# Patient Record
Sex: FEMALE | Race: WHITE | NOT HISPANIC OR LATINO
[De-identification: names, ages, dates, MRNs, and addresses within clinical notes are randomized per-mention and may not be internally consistent; named-entity substitution may affect disease eponyms.]

---

## 2017-01-19 ENCOUNTER — APPOINTMENT (OUTPATIENT)
Dept: SURGICAL ONCOLOGY | Facility: CLINIC | Age: 77
End: 2017-01-19

## 2017-01-19 VITALS
DIASTOLIC BLOOD PRESSURE: 75 MMHG | HEIGHT: 65 IN | WEIGHT: 182 LBS | HEART RATE: 61 BPM | SYSTOLIC BLOOD PRESSURE: 146 MMHG | BODY MASS INDEX: 30.32 KG/M2

## 2017-02-28 ENCOUNTER — NON-APPOINTMENT (OUTPATIENT)
Age: 77
End: 2017-02-28

## 2017-02-28 ENCOUNTER — APPOINTMENT (OUTPATIENT)
Dept: CARDIOLOGY | Facility: CLINIC | Age: 77
End: 2017-02-28

## 2017-02-28 VITALS — DIASTOLIC BLOOD PRESSURE: 82 MMHG | RESPIRATION RATE: 14 BRPM | SYSTOLIC BLOOD PRESSURE: 136 MMHG

## 2017-02-28 VITALS — OXYGEN SATURATION: 95 % | WEIGHT: 184 LBS | HEIGHT: 65 IN | HEART RATE: 58 BPM | BODY MASS INDEX: 30.66 KG/M2

## 2017-02-28 DIAGNOSIS — G47.33 OBSTRUCTIVE SLEEP APNEA (ADULT) (PEDIATRIC): ICD-10-CM

## 2017-02-28 RX ORDER — UBIDECARENONE 100 MG
100 CAPSULE ORAL
Qty: 90 | Refills: 0 | Status: ACTIVE | COMMUNITY

## 2017-03-21 ENCOUNTER — APPOINTMENT (OUTPATIENT)
Dept: CARDIOLOGY | Facility: CLINIC | Age: 77
End: 2017-03-21

## 2017-04-18 ENCOUNTER — APPOINTMENT (OUTPATIENT)
Dept: CARDIOLOGY | Facility: CLINIC | Age: 77
End: 2017-04-18

## 2017-07-27 ENCOUNTER — APPOINTMENT (OUTPATIENT)
Dept: PULMONOLOGY | Facility: CLINIC | Age: 77
End: 2017-07-27
Payer: MEDICARE

## 2017-07-27 VITALS
HEIGHT: 65 IN | WEIGHT: 180 LBS | BODY MASS INDEX: 29.99 KG/M2 | HEART RATE: 64 BPM | RESPIRATION RATE: 15 BRPM | SYSTOLIC BLOOD PRESSURE: 130 MMHG | TEMPERATURE: 99 F | DIASTOLIC BLOOD PRESSURE: 76 MMHG

## 2017-07-27 PROCEDURE — 99204 OFFICE O/P NEW MOD 45 MIN: CPT | Mod: GC

## 2017-07-27 RX ORDER — CEFADROXIL 500 MG/1
500 CAPSULE ORAL
Qty: 14 | Refills: 0 | Status: DISCONTINUED | COMMUNITY
Start: 2016-10-11

## 2017-07-27 RX ORDER — ACETAMINOPHEN AND CODEINE PHOSPHATE 300; 15 MG/1; MG/1
300-15 TABLET ORAL
Qty: 15 | Refills: 0 | Status: DISCONTINUED | COMMUNITY
Start: 2016-10-11

## 2017-07-27 RX ORDER — DOXYCYCLINE HYCLATE 100 MG/1
100 CAPSULE ORAL
Qty: 7 | Refills: 0 | Status: DISCONTINUED | COMMUNITY
Start: 2016-10-31

## 2017-09-10 ENCOUNTER — APPOINTMENT (OUTPATIENT)
Dept: SLEEP CENTER | Facility: CLINIC | Age: 77
End: 2017-09-10
Payer: MEDICARE

## 2017-09-10 ENCOUNTER — OUTPATIENT (OUTPATIENT)
Dept: OUTPATIENT SERVICES | Facility: HOSPITAL | Age: 77
LOS: 1 days | End: 2017-09-10
Payer: MEDICARE

## 2017-09-10 PROCEDURE — 95810 POLYSOM 6/> YRS 4/> PARAM: CPT

## 2017-09-10 PROCEDURE — 95810 POLYSOM 6/> YRS 4/> PARAM: CPT | Mod: 26

## 2017-09-11 DIAGNOSIS — G47.33 OBSTRUCTIVE SLEEP APNEA (ADULT) (PEDIATRIC): ICD-10-CM

## 2017-10-11 ENCOUNTER — APPOINTMENT (OUTPATIENT)
Dept: SURGICAL ONCOLOGY | Facility: CLINIC | Age: 77
End: 2017-10-11
Payer: MEDICARE

## 2017-10-11 VITALS
DIASTOLIC BLOOD PRESSURE: 74 MMHG | BODY MASS INDEX: 29.99 KG/M2 | WEIGHT: 180 LBS | SYSTOLIC BLOOD PRESSURE: 128 MMHG | HEIGHT: 65 IN | RESPIRATION RATE: 16 BRPM | HEART RATE: 53 BPM

## 2017-10-11 PROCEDURE — 99213 OFFICE O/P EST LOW 20 MIN: CPT

## 2017-10-14 ENCOUNTER — TRANSCRIPTION ENCOUNTER (OUTPATIENT)
Age: 77
End: 2017-10-14

## 2018-05-17 ENCOUNTER — APPOINTMENT (OUTPATIENT)
Dept: CARDIOLOGY | Facility: CLINIC | Age: 78
End: 2018-05-17
Payer: MEDICARE

## 2018-05-17 ENCOUNTER — NON-APPOINTMENT (OUTPATIENT)
Age: 78
End: 2018-05-17

## 2018-05-17 VITALS — HEIGHT: 65 IN | BODY MASS INDEX: 31.49 KG/M2 | WEIGHT: 189 LBS

## 2018-05-17 VITALS — DIASTOLIC BLOOD PRESSURE: 79 MMHG | SYSTOLIC BLOOD PRESSURE: 154 MMHG | HEART RATE: 50 BPM | OXYGEN SATURATION: 97 %

## 2018-05-17 DIAGNOSIS — I35.1 NONRHEUMATIC AORTIC (VALVE) INSUFFICIENCY: ICD-10-CM

## 2018-05-17 PROCEDURE — 93000 ELECTROCARDIOGRAM COMPLETE: CPT

## 2018-05-17 PROCEDURE — 99214 OFFICE O/P EST MOD 30 MIN: CPT | Mod: 25

## 2019-02-08 ENCOUNTER — RECORD ABSTRACTING (OUTPATIENT)
Age: 79
End: 2019-02-08

## 2019-02-08 ENCOUNTER — RX RENEWAL (OUTPATIENT)
Age: 79
End: 2019-02-08

## 2019-02-08 DIAGNOSIS — Z80.51 FAMILY HISTORY OF MALIGNANT NEOPLASM OF KIDNEY: ICD-10-CM

## 2019-02-08 DIAGNOSIS — Z91.013 ALLERGY TO SEAFOOD: ICD-10-CM

## 2019-02-08 DIAGNOSIS — Z80.1 FAMILY HISTORY OF MALIGNANT NEOPLASM OF TRACHEA, BRONCHUS AND LUNG: ICD-10-CM

## 2019-02-08 DIAGNOSIS — Z83.3 FAMILY HISTORY OF DIABETES MELLITUS: ICD-10-CM

## 2019-02-08 DIAGNOSIS — Z86.79 PERSONAL HISTORY OF OTHER DISEASES OF THE CIRCULATORY SYSTEM: ICD-10-CM

## 2019-02-08 DIAGNOSIS — Z86.39 PERSONAL HISTORY OF OTHER ENDOCRINE, NUTRITIONAL AND METABOLIC DISEASE: ICD-10-CM

## 2019-02-08 DIAGNOSIS — Z87.19 PERSONAL HISTORY OF OTHER DISEASES OF THE DIGESTIVE SYSTEM: ICD-10-CM

## 2019-02-11 ENCOUNTER — RX RENEWAL (OUTPATIENT)
Age: 79
End: 2019-02-11

## 2019-03-27 ENCOUNTER — APPOINTMENT (OUTPATIENT)
Dept: INTERNAL MEDICINE | Facility: CLINIC | Age: 79
End: 2019-03-27
Payer: MEDICARE

## 2019-03-27 VITALS
WEIGHT: 185 LBS | BODY MASS INDEX: 30.82 KG/M2 | HEART RATE: 55 BPM | SYSTOLIC BLOOD PRESSURE: 130 MMHG | OXYGEN SATURATION: 98 % | DIASTOLIC BLOOD PRESSURE: 80 MMHG | TEMPERATURE: 97.4 F | HEIGHT: 65 IN | RESPIRATION RATE: 16 BRPM

## 2019-03-27 PROCEDURE — 99214 OFFICE O/P EST MOD 30 MIN: CPT

## 2019-03-27 NOTE — PLAN
[FreeTextEntry1] : Musculoskeletal \par cane prescription - gave written prescription for offset cane\par Gastroenterology\par GERD - continue Omeprazole 20mg .p.o.q.d. - continue antireflux measures\par Endocrinology\par hyperlipidemia - continue Atorvastatin 40mg .p.o.q.d. - continue low fat/low cholesterol diet \par Cardiology\par hypertension - continue Metoprolol 25mg .p.o.q.d.  - continue low sodium diet \par

## 2019-03-27 NOTE — HISTORY OF PRESENT ILLNESS
[FreeTextEntry1] : prescription for a offset cane [de-identified] : Patient is a 78 year old female with a past medical history as below who presents for prescription for cane with offset handle. She is currently borrowing someone else's cane at this time. She has a history of gait instability. Patient saw a neurologist and had nerve conduction studies and diagnosed patient with peripheral neuropathy. Patient states the neurologist discussed a possible association with spinal stenosis. She is taking all medications as prescribed and denies any adverse reactions or side effects. Patient continues Omeprazole 20mg and states she must maintain the medication to avoid reflux. Patient denies any myalgia or joint aches on Atorvastatin 20mg. Patient denies dizziness or lightheadedness on Metoprolol 25mg. Patient saw gynecologist recently who recommended starting cranberry supplements as she had WBCs in her urine.

## 2019-03-27 NOTE — ADDENDUM
[FreeTextEntry1] : I, Jossie Juan, acted solely as scribe for Dr. Maycol Saldana DO on this date Mar 27 2019 11:40AM .\par \par All medical record entries made by the Scribe were at my, Dr. Maycol Saldana DO direction and personally dictated by me on Mar 27 2019 11:40AM . I have reviewed the chart and agree that the record accurately reflects my personal performance of the history, physical exam, assessment and plan. I have also personally directed, reviewed and agreed with the chart.

## 2019-03-27 NOTE — ASSESSMENT
[FreeTextEntry1] : Patient is a 78 year old female with a past medical history as above who presents for prescription for an offset cane.\par

## 2019-03-27 NOTE — PHYSICAL EXAM

## 2019-04-09 ENCOUNTER — RX RENEWAL (OUTPATIENT)
Age: 79
End: 2019-04-09

## 2019-05-08 ENCOUNTER — RX RENEWAL (OUTPATIENT)
Age: 79
End: 2019-05-08

## 2019-05-09 ENCOUNTER — TRANSCRIPTION ENCOUNTER (OUTPATIENT)
Age: 79
End: 2019-05-09

## 2019-05-21 ENCOUNTER — APPOINTMENT (OUTPATIENT)
Dept: INTERNAL MEDICINE | Facility: CLINIC | Age: 79
End: 2019-05-21
Payer: MEDICARE

## 2019-05-21 VITALS
HEART RATE: 53 BPM | SYSTOLIC BLOOD PRESSURE: 128 MMHG | DIASTOLIC BLOOD PRESSURE: 70 MMHG | HEIGHT: 65 IN | RESPIRATION RATE: 14 BRPM | TEMPERATURE: 98.2 F | WEIGHT: 187.25 LBS | BODY MASS INDEX: 31.2 KG/M2 | OXYGEN SATURATION: 98 %

## 2019-05-21 PROCEDURE — 99214 OFFICE O/P EST MOD 30 MIN: CPT | Mod: 25

## 2019-05-21 PROCEDURE — 36415 COLL VENOUS BLD VENIPUNCTURE: CPT

## 2019-05-21 NOTE — HISTORY OF PRESENT ILLNESS
[FreeTextEntry1] : fasting blood work and general follow-up\par  [de-identified] : Patient is a 78 year old female with a past medical history as below who presents for fasting blood work and general follow-up. Patient states she is taking all medications as prescribed. She denies any new myalgias/arthralgias on Lipitor. She notes a recent episode of lightheadedness/dizziness while in a seated position. She notes history of vasovagal syncope.  She notes using a cane for movement outdoors given history of gait instability. Patient requests MMR titer test. \par

## 2019-05-21 NOTE — PHYSICAL EXAM

## 2019-05-21 NOTE — ADDENDUM
[FreeTextEntry1] : I, Maxx Cevallos, acted solely as scribe for Dr. Maycol Saldana DO on this date 05/21/2019  1:10PM .\par \par All medical record entries made by the Scribe were at my, Dr. Maycol Saldana DO direction and personally dictated by me on 05/21/2019  1:10PM. I have reviewed the chart and agree that the record accurately reflects my personal performance of the history, physical exam, assessment and plan. I have also personally directed, reviewed and agreed with the chart.\par

## 2019-05-21 NOTE — ASSESSMENT
[FreeTextEntry1] : Patient is a 78 year old female with a past medical history as above who presents for fasting blood work and general follow-up.

## 2019-05-21 NOTE — PLAN
[FreeTextEntry1] : Cardiology\par hypertension-continue Metoprolol Tartrate 25mg BID p.o.q.d.- continue low sodium diet\par continue CoQ10 100mg p.o.q.d.\par Endocrinology\par hypercholesterolemia-continue Lipitor 40mg p.o.q.d. as directed and omega-3 fish oil capsules 1000mg BID p.o.q.d. - continue low cholesterol/low fat diet - check FLP \par obesity-continue low carbohydrate diet and CV exercise\par continue vitamin D3 1000 unit capsules p.o.q.d.\par Neurology\par dizziness-check CBC/CMP-blood pressure normal-patient to follow up if symptoms persist/worsen \par gait instability-continue using cane as needed for movement outdoors \par Gastroenterology\par GERD-continue Omeprazole 20mg p.o.q.d. - continue antireflux measures\par Infectious Disease\par MMR titer drawn \par \par check female panel \par

## 2019-05-21 NOTE — REVIEW OF SYSTEMS
[Dizziness] : dizziness [Unsteady Walking] : ataxia [Negative] : Heme/Lymph [de-identified] : bumps on fingers on left hand

## 2019-05-28 LAB
25(OH)D3 SERPL-MCNC: 41.3 NG/ML
ALBUMIN SERPL ELPH-MCNC: 4.3 G/DL
ALP BLD-CCNC: 78 U/L
ALT SERPL-CCNC: 23 U/L
ANION GAP SERPL CALC-SCNC: 12 MMOL/L
AST SERPL-CCNC: 20 U/L
BASOPHILS # BLD AUTO: 0.03 K/UL
BASOPHILS NFR BLD AUTO: 0.6 %
BILIRUB SERPL-MCNC: 0.6 MG/DL
BUN SERPL-MCNC: 18 MG/DL
CALCIUM SERPL-MCNC: 9.5 MG/DL
CHLORIDE SERPL-SCNC: 103 MMOL/L
CHOLEST SERPL-MCNC: 177 MG/DL
CHOLEST/HDLC SERPL: 4.3 RATIO
CO2 SERPL-SCNC: 26 MMOL/L
CREAT SERPL-MCNC: 0.76 MG/DL
EOSINOPHIL # BLD AUTO: 0.23 K/UL
EOSINOPHIL NFR BLD AUTO: 4.6 %
ESTIMATED AVERAGE GLUCOSE: 117 MG/DL
GLUCOSE SERPL-MCNC: 102 MG/DL
HBA1C MFR BLD HPLC: 5.7 %
HCT VFR BLD CALC: 39 %
HDLC SERPL-MCNC: 41 MG/DL
HGB BLD-MCNC: 12.5 G/DL
IMM GRANULOCYTES NFR BLD AUTO: 0.2 %
LDLC SERPL CALC-MCNC: 99 MG/DL
LYMPHOCYTES # BLD AUTO: 2.41 K/UL
LYMPHOCYTES NFR BLD AUTO: 48.7 %
MAN DIFF?: NORMAL
MCHC RBC-ENTMCNC: 29 PG
MCHC RBC-ENTMCNC: 32.1 GM/DL
MCV RBC AUTO: 90.5 FL
MEV IGG FLD QL IA: >300 AU/ML
MEV IGG+IGM SER-IMP: POSITIVE
MONOCYTES # BLD AUTO: 0.43 K/UL
MONOCYTES NFR BLD AUTO: 8.7 %
MUV AB SER-ACNC: POSITIVE
MUV IGG SER QL IA: 276 AU/ML
NEUTROPHILS # BLD AUTO: 1.84 K/UL
NEUTROPHILS NFR BLD AUTO: 37.2 %
PLATELET # BLD AUTO: 214 K/UL
POTASSIUM SERPL-SCNC: 4.5 MMOL/L
PROT SERPL-MCNC: 7.1 G/DL
RBC # BLD: 4.31 M/UL
RBC # FLD: 14 %
RUBV IGG FLD-ACNC: 2.4 INDEX
RUBV IGG SER-IMP: POSITIVE
SODIUM SERPL-SCNC: 141 MMOL/L
TRIGL SERPL-MCNC: 187 MG/DL
TSH SERPL-ACNC: 2.58 UIU/ML
WBC # FLD AUTO: 4.95 K/UL

## 2019-07-08 ENCOUNTER — RX RENEWAL (OUTPATIENT)
Age: 79
End: 2019-07-08

## 2019-07-09 ENCOUNTER — RX RENEWAL (OUTPATIENT)
Age: 79
End: 2019-07-09

## 2019-07-10 ENCOUNTER — RX RENEWAL (OUTPATIENT)
Age: 79
End: 2019-07-10

## 2019-09-10 ENCOUNTER — APPOINTMENT (OUTPATIENT)
Dept: RHEUMATOLOGY | Facility: CLINIC | Age: 79
End: 2019-09-10
Payer: MEDICARE

## 2019-09-10 DIAGNOSIS — M79.641 PAIN IN RIGHT HAND: ICD-10-CM

## 2019-09-10 DIAGNOSIS — M79.642 PAIN IN RIGHT HAND: ICD-10-CM

## 2019-09-10 PROCEDURE — 99204 OFFICE O/P NEW MOD 45 MIN: CPT

## 2019-09-10 NOTE — PHYSICAL EXAM
[General Appearance - Alert] : alert [General Appearance - In No Acute Distress] : in no acute distress [General Appearance - Well Nourished] : well nourished [General Appearance - Well Developed] : well developed [General Appearance - Well-Appearing] : healthy appearing [Sclera] : the sclera and conjunctiva were normal [PERRL With Normal Accommodation] : pupils were equal in size, round, and reactive to light [Extraocular Movements] : extraocular movements were intact [Outer Ear] : the ears and nose were normal in appearance [Oropharynx] : the oropharynx was normal [Neck Appearance] : the appearance of the neck was normal [Neck Cervical Mass (___cm)] : no neck mass was observed [Jugular Venous Distention Increased] : there was no jugular-venous distention [Thyroid Diffuse Enlargement] : the thyroid was not enlarged [Thyroid Nodule] : there were no palpable thyroid nodules [Auscultation Breath Sounds / Voice Sounds] : lungs were clear to auscultation bilaterally [Heart Rate And Rhythm] : heart rate was normal and rhythm regular [Heart Sounds] : normal S1 and S2 [Heart Sounds Gallop] : no gallops [Murmurs] : no murmurs [Heart Sounds Pericardial Friction Rub] : no pericardial rub [Abnormal Walk] : normal gait [Nail Clubbing] : no clubbing  or cyanosis of the fingernails [Musculoskeletal - Swelling] : no joint swelling seen [Motor Tone] : muscle strength and tone were normal [Skin Color & Pigmentation] : normal skin color and pigmentation [Skin Turgor] : normal skin turgor [] : no rash [Deep Tendon Reflexes (DTR)] : deep tendon reflexes were 2+ and symmetric [Sensation] : the sensory exam was normal to light touch and pinprick [No Focal Deficits] : no focal deficits [FreeTextEntry1] : ambulates with cane [Oriented To Time, Place, And Person] : oriented to person, place, and time [Impaired Insight] : insight and judgment were intact [Mood] : the mood was normal [Affect] : the affect was normal [Memory Recent] : recent memory was not impaired [Memory Remote] : remote memory was not impaired

## 2019-09-10 NOTE — CONSULT LETTER
[Dear  ___] : Dear  [unfilled], [Consult Letter:] : I had the pleasure of evaluating your patient, [unfilled]. [Please see my note below.] : Please see my note below. [Consult Closing:] : Thank you very much for allowing me to participate in the care of this patient.  If you have any questions, please do not hesitate to contact me. [FreeTextEntry2] : Maycol Saldana

## 2019-09-10 NOTE — HISTORY OF PRESENT ILLNESS
[FreeTextEntry1] : 79 yo here for changes in her hands.\par \par Has developed changes in her hands left > right.  Knuckles of the left 2 and 3 as well as the left 4 and 3 particularly.  there has never been swelling, redness or warmth.  There really is no pain - more of a noticeable ache.  Cannot wear rings on the fingers any longer secondary to the enlargement of the joints.  doesn’t take any medications because the hands don’t hurt.  Is a very active  and has no limitation in this activity from that. \par \par there is no family hx of arthritis and mom's hands were fine.   Rheum ROS is negative.  \par \par \par \par  [None] : The patient is currently asymptomatic [de-identified] : - dry mouth and dry eyes - mild and chronic.  can eat crackers, makes tears.  denies zuhair quality or redness of the eye.

## 2019-09-10 NOTE — ASSESSMENT
[FreeTextEntry1] : 79 yo woman hx of SpSt and DDD here with deforminties of the hands \par \par #arthritis - chronic non-inflammatory cool changes - likely osteoarthritis\par - xray bilateral hands\par - d/w patient treatment options - if pain increases can use Tylenol, NSAID, paraffin etc - for symptomatic control \par - however minimal sx therefore doesn’t feel ready for pain meds. tylenol \par - will consider turmeric given the slight decreased in rom at the PIP joints\par \par

## 2019-09-11 ENCOUNTER — RX RENEWAL (OUTPATIENT)
Age: 79
End: 2019-09-11

## 2019-10-09 ENCOUNTER — RX RENEWAL (OUTPATIENT)
Age: 79
End: 2019-10-09

## 2019-10-11 ENCOUNTER — MESSAGE (OUTPATIENT)
Age: 79
End: 2019-10-11

## 2019-10-18 ENCOUNTER — MESSAGE (OUTPATIENT)
Age: 79
End: 2019-10-18

## 2019-10-29 ENCOUNTER — RX CHANGE (OUTPATIENT)
Age: 79
End: 2019-10-29

## 2019-10-30 ENCOUNTER — APPOINTMENT (OUTPATIENT)
Dept: INTERNAL MEDICINE | Facility: CLINIC | Age: 79
End: 2019-10-30
Payer: MEDICARE

## 2019-10-30 VITALS
RESPIRATION RATE: 16 BRPM | HEART RATE: 55 BPM | TEMPERATURE: 97.7 F | BODY MASS INDEX: 31.32 KG/M2 | HEIGHT: 65 IN | OXYGEN SATURATION: 97 % | WEIGHT: 188 LBS | SYSTOLIC BLOOD PRESSURE: 130 MMHG | DIASTOLIC BLOOD PRESSURE: 74 MMHG

## 2019-10-30 PROCEDURE — 99214 OFFICE O/P EST MOD 30 MIN: CPT | Mod: 25

## 2019-10-30 PROCEDURE — G0444 DEPRESSION SCREEN ANNUAL: CPT | Mod: 59

## 2019-10-30 PROCEDURE — 36415 COLL VENOUS BLD VENIPUNCTURE: CPT

## 2019-10-30 PROCEDURE — G0442 ANNUAL ALCOHOL SCREEN 15 MIN: CPT | Mod: 59

## 2019-10-30 NOTE — PHYSICAL EXAM
[No Acute Distress] : no acute distress [Well Nourished] : well nourished [Well Developed] : well developed [Well-Appearing] : well-appearing [Normal Sclera/Conjunctiva] : normal sclera/conjunctiva [PERRL] : pupils equal round and reactive to light [EOMI] : extraocular movements intact [Normal Outer Ear/Nose] : the outer ears and nose were normal in appearance [Normal Oropharynx] : the oropharynx was normal [No JVD] : no jugular venous distention [No Lymphadenopathy] : no lymphadenopathy [Supple] : supple [Thyroid Normal, No Nodules] : the thyroid was normal and there were no nodules present [No Respiratory Distress] : no respiratory distress  [No Accessory Muscle Use] : no accessory muscle use [Clear to Auscultation] : lungs were clear to auscultation bilaterally [Normal Rate] : normal rate  [Regular Rhythm] : with a regular rhythm [Normal S1, S2] : normal S1 and S2 [No Murmur] : no murmur heard [No Carotid Bruits] : no carotid bruits [No Abdominal Bruit] : a ~M bruit was not heard ~T in the abdomen [No Varicosities] : no varicosities [Pedal Pulses Present] : the pedal pulses are present [No Edema] : there was no peripheral edema [No Palpable Aorta] : no palpable aorta [No Extremity Clubbing/Cyanosis] : no extremity clubbing/cyanosis [Soft] : abdomen soft [Non Tender] : non-tender [Non-distended] : non-distended [No Masses] : no abdominal mass palpated [No HSM] : no HSM [Normal Bowel Sounds] : normal bowel sounds [Normal Posterior Cervical Nodes] : no posterior cervical lymphadenopathy [Normal Anterior Cervical Nodes] : no anterior cervical lymphadenopathy [No CVA Tenderness] : no CVA  tenderness [No Spinal Tenderness] : no spinal tenderness [No Joint Swelling] : no joint swelling [Grossly Normal Strength/Tone] : grossly normal strength/tone [No Rash] : no rash [Coordination Grossly Intact] : coordination grossly intact [No Focal Deficits] : no focal deficits [Normal Gait] : normal gait [Deep Tendon Reflexes (DTR)] : deep tendon reflexes were 2+ and symmetric [Normal Affect] : the affect was normal [Normal Insight/Judgement] : insight and judgment were intact [de-identified] : obese

## 2019-10-30 NOTE — ASSESSMENT
[FreeTextEntry1] : Patient is a 78 year old female with a past medical history as above who presents for fasting blood work, Rx refill, and general follow-up.\par

## 2019-10-30 NOTE — HISTORY OF PRESENT ILLNESS
[FreeTextEntry1] : fasting blood work, Rx refill, and general follow-up\par  [de-identified] : Patient is a 78 year old female with a past medical history as below who presents for fasting blood work, Rx refill, and general follow-up. Patient states she is taking all medications as prescribed and denies any adverse reactions or side effects. She denies lightheadedness or dizziness on Metoprolol Tartrate. GERD is well-managed on Omeprazole. Patient notes seeing rheumatologist, Dr. Warner who recommended starting Glucosamine/Chondroitin for osteoarthritis in her fingers. She also notes an intermittent tingling or pins/needles sensation in her fingers. She notes back pain. Patient states she frequently walks with a cane. Patient states she received the flu vaccine and 1st dose of Shingrix at [facility]. Patient requests Rx refill for Atorvastatin, Metoprolol Tartrate, and Omeprazole.

## 2019-10-30 NOTE — HEALTH RISK ASSESSMENT
[No] : In the past 12 months have you used drugs other than those required for medical reasons? No [0] : 1) Little interest or pleasure doing things: Not at all (0) [1] : 2) Feeling down, depressed, or hopeless for several days (1) [] : No [[id number]] : 1

## 2019-10-30 NOTE — REVIEW OF SYSTEMS
[Negative] : Heme/Lymph [Back Pain] : back pain [FreeTextEntry9] : osteoarthritis in fingers  [de-identified] : pins/needles sensation and tingling in fingers

## 2019-10-30 NOTE — PLAN
[FreeTextEntry1] : Cardiology\par hypertension - continue Metoprolol Tartrate 25mg BID p.o.q.d., Rx filled - continue low sodium diet\par continue Co-Q 10 100mg p.o.q.d.\par Endocrinology\par hypercholesterolemia - continue Atorvastatin Calcium 40mg p.o.q.d. as directed, Rx filled and omega-3 fish oil 1000mg BID p.o.q.d. - continue low cholesterol/low fat diet - check FLP and LFTs \par obesity - continue low carbohydrate diet\par continue vitamin D-3 1000 unit capsules p.o.q.d. - check vitamin D\par Musculoskeletal\par osteoarthritis - recommended start Osteo Bi-Flex p.o.q.d. - follow up with rheumatologist, Dr. Browning-Labarca as needed\par Constitutional/Neurology\par gait instability - continue using cane as needed for movement outdoors \par Gastroenterology\par GERD - continue Omeprazole 20mg p.o.q.d., Rx filled - continue antireflux measures\par Infectious Disease\par flu vaccine - evidence of vaccine administration to be provided by patient at next follow-up visit\par Shingrix - patient to follow up at [facility] for 2nd dose; evidence of vaccine administration to be provided by patient at next follow-up visit \par  \par check female panel

## 2019-10-30 NOTE — ADDENDUM
[FreeTextEntry1] : I, Maxx Cevallos, acted solely as scribe for Dr. Maycol Saldana DO on this date 10/30/2019 12:10PM .\par \par All medical record entries made by the Scribe were at my, Dr. Maycol Saldana DO direction and personally dictated by me on 10/30/2019 12:10PM. I have reviewed the chart and agree that the record accurately reflects my personal performance of the history, physical exam, assessment and plan. I have also personally directed, reviewed and agreed with the chart.\par

## 2019-11-08 LAB
25(OH)D3 SERPL-MCNC: 32.7 NG/ML
ALBUMIN SERPL ELPH-MCNC: 4.5 G/DL
ALP BLD-CCNC: 80 U/L
ALT SERPL-CCNC: 22 U/L
ANION GAP SERPL CALC-SCNC: 12 MMOL/L
AST SERPL-CCNC: 20 U/L
BASOPHILS # BLD AUTO: 0.03 K/UL
BASOPHILS NFR BLD AUTO: 0.6 %
BILIRUB SERPL-MCNC: 0.6 MG/DL
BUN SERPL-MCNC: 13 MG/DL
CALCIUM SERPL-MCNC: 9.6 MG/DL
CHLORIDE SERPL-SCNC: 106 MMOL/L
CHOLEST SERPL-MCNC: 180 MG/DL
CHOLEST/HDLC SERPL: 4.2 RATIO
CO2 SERPL-SCNC: 26 MMOL/L
CREAT SERPL-MCNC: 0.7 MG/DL
EOSINOPHIL # BLD AUTO: 0.19 K/UL
EOSINOPHIL NFR BLD AUTO: 4 %
ESTIMATED AVERAGE GLUCOSE: 117 MG/DL
GLUCOSE SERPL-MCNC: 100 MG/DL
HBA1C MFR BLD HPLC: 5.7 %
HCT VFR BLD CALC: 40.5 %
HDLC SERPL-MCNC: 43 MG/DL
HGB BLD-MCNC: 12.7 G/DL
IMM GRANULOCYTES NFR BLD AUTO: 0 %
LDLC SERPL CALC-MCNC: 93 MG/DL
LYMPHOCYTES # BLD AUTO: 1.7 K/UL
LYMPHOCYTES NFR BLD AUTO: 35.8 %
MAN DIFF?: NORMAL
MCHC RBC-ENTMCNC: 29.3 PG
MCHC RBC-ENTMCNC: 31.4 GM/DL
MCV RBC AUTO: 93.3 FL
MONOCYTES # BLD AUTO: 0.36 K/UL
MONOCYTES NFR BLD AUTO: 7.6 %
NEUTROPHILS # BLD AUTO: 2.47 K/UL
NEUTROPHILS NFR BLD AUTO: 52 %
PLATELET # BLD AUTO: 217 K/UL
POTASSIUM SERPL-SCNC: 4.3 MMOL/L
PROT SERPL-MCNC: 7.1 G/DL
RBC # BLD: 4.34 M/UL
RBC # FLD: 14.6 %
SODIUM SERPL-SCNC: 144 MMOL/L
TRIGL SERPL-MCNC: 222 MG/DL
TSH SERPL-ACNC: 2.5 UIU/ML
WBC # FLD AUTO: 4.75 K/UL

## 2020-02-28 ENCOUNTER — APPOINTMENT (OUTPATIENT)
Dept: INTERNAL MEDICINE | Facility: CLINIC | Age: 80
End: 2020-02-28
Payer: MEDICARE

## 2020-02-28 ENCOUNTER — NON-APPOINTMENT (OUTPATIENT)
Age: 80
End: 2020-02-28

## 2020-02-28 VITALS
DIASTOLIC BLOOD PRESSURE: 78 MMHG | BODY MASS INDEX: 31.65 KG/M2 | OXYGEN SATURATION: 97 % | WEIGHT: 190 LBS | HEIGHT: 65 IN | TEMPERATURE: 97.6 F | HEART RATE: 68 BPM | RESPIRATION RATE: 15 BRPM | SYSTOLIC BLOOD PRESSURE: 130 MMHG

## 2020-02-28 DIAGNOSIS — B34.9 VIRAL INFECTION, UNSPECIFIED: ICD-10-CM

## 2020-02-28 DIAGNOSIS — R05 COUGH: ICD-10-CM

## 2020-02-28 LAB
FLUAV SPEC QL CULT: NORMAL
FLUBV AG SPEC QL IA: NORMAL

## 2020-02-28 PROCEDURE — 94375 RESPIRATORY FLOW VOLUME LOOP: CPT

## 2020-02-28 PROCEDURE — 87804 INFLUENZA ASSAY W/OPTIC: CPT | Mod: QW

## 2020-02-28 PROCEDURE — 99214 OFFICE O/P EST MOD 30 MIN: CPT | Mod: 25

## 2020-02-28 RX ORDER — OMEGA-3/DHA/EPA/FISH OIL 300-1000MG
1000 CAPSULE,DELAYED RELEASE (ENTERIC COATED) ORAL TWICE DAILY
Refills: 0 | Status: ACTIVE | COMMUNITY

## 2020-02-28 NOTE — COUNSELING
[Decrease Portions] : decrease portions [Good understanding] : Patient has a good understanding of disease, goals and obesity follow-up plan [Encouraged to increase physical activity] : Encouraged to increase physical activity [Benefits of weight loss discussed] : Benefits of weight loss discussed

## 2020-03-03 ENCOUNTER — TRANSCRIPTION ENCOUNTER (OUTPATIENT)
Age: 80
End: 2020-03-03

## 2020-03-06 NOTE — ASSESSMENT
[FreeTextEntry1] : Patient is a 79 year old female with a past medical history as above who presents for evaluation of a cough and diarrhea.

## 2020-03-06 NOTE — PHYSICAL EXAM
[No Acute Distress] : no acute distress [Well Nourished] : well nourished [Well Developed] : well developed [Well-Appearing] : well-appearing [EOMI] : extraocular movements intact [PERRL] : pupils equal round and reactive to light [Normal Sclera/Conjunctiva] : normal sclera/conjunctiva [Normal Outer Ear/Nose] : the outer ears and nose were normal in appearance [Normal Oropharynx] : the oropharynx was normal [No JVD] : no jugular venous distention [Supple] : supple [No Lymphadenopathy] : no lymphadenopathy [Thyroid Normal, No Nodules] : the thyroid was normal and there were no nodules present [No Respiratory Distress] : no respiratory distress  [Normal Rate] : normal rate  [Clear to Auscultation] : lungs were clear to auscultation bilaterally [No Accessory Muscle Use] : no accessory muscle use [Regular Rhythm] : with a regular rhythm [Normal S1, S2] : normal S1 and S2 [No Murmur] : no murmur heard [No Carotid Bruits] : no carotid bruits [No Abdominal Bruit] : a ~M bruit was not heard ~T in the abdomen [No Varicosities] : no varicosities [Pedal Pulses Present] : the pedal pulses are present [No Edema] : there was no peripheral edema [No Palpable Aorta] : no palpable aorta [Soft] : abdomen soft [No Extremity Clubbing/Cyanosis] : no extremity clubbing/cyanosis [Non-distended] : non-distended [Non Tender] : non-tender [No HSM] : no HSM [No Masses] : no abdominal mass palpated [Normal Posterior Cervical Nodes] : no posterior cervical lymphadenopathy [Normal Bowel Sounds] : normal bowel sounds [Normal Anterior Cervical Nodes] : no anterior cervical lymphadenopathy [No CVA Tenderness] : no CVA  tenderness [No Spinal Tenderness] : no spinal tenderness [No Joint Swelling] : no joint swelling [No Rash] : no rash [Grossly Normal Strength/Tone] : grossly normal strength/tone [No Focal Deficits] : no focal deficits [Coordination Grossly Intact] : coordination grossly intact [Deep Tendon Reflexes (DTR)] : deep tendon reflexes were 2+ and symmetric [Normal Gait] : normal gait [Normal Insight/Judgement] : insight and judgment were intact [Normal Affect] : the affect was normal [de-identified] : obese

## 2020-03-06 NOTE — HISTORY OF PRESENT ILLNESS
[Moderate] : moderate [Cold Symptoms] : cold symptoms [___ Days ago] : [unfilled] days ago [Constant] : constant [Congestion] : congestion [Cough] : cough [Anorexia] : anorexia [Shortness Of Breath] : shortness of breath [Fatigue] : fatigue [Sore Throat] : no sore throat [Wheezing] : no wheezing [Chills] : no chills [Earache] : no earache [Headache] : no headache [Fever] : no fever [FreeTextEntry8] : Irirs states the diarrhea is much better.  Took OTC antidiarrhea medication which helped.  Still feels nausea but is improving.  \par Cough is the same.  Denies taking OTC medications.  Denies having a fever.  \par \par Also want a rx to see a cardiologist.  Patient states her cardiologist retired.  \par \par states since last visit been taking omega 3 2 tabs poi bid

## 2020-03-06 NOTE — DATA REVIEWED
[No studies available for review at this time.] : No studies available for review at this time. [FreeTextEntry1] : PFT normal \par Normal flu swab

## 2020-03-06 NOTE — HEALTH RISK ASSESSMENT
[No] : In the past 12 months have you used drugs other than those required for medical reasons? No [0] : 1) Little interest or pleasure doing things: Not at all (0) [1] : 2) Feeling down, depressed, or hopeless for several days (1) [] : No [[id number]] : 1 [Patient reported mammogram was normal] : Patient reported mammogram was normal [MammogramDate] : 02/19 [MammogramComments] : [id number]

## 2020-03-06 NOTE — PLAN
[FreeTextEntry1] : Id- viral syndrome - Flu A and B was negative-  Advised to increase fluids, Brat diet, and rest.  If not improving or getting worse to call the office ASAP.  \par \par Pulmonary - Cough -  Advised PFT was normal and lungs was CTA.  Advised to take benzonatate 200 mg tid.  \par \par Cardiology\par Pulmonary HTN -  Advised to follow up with cardio.  As per pt last cardiology exam was normal.  \par hypertension - continue Metoprolol Tartrate 25 mg BID p.o.q.d., Rx filled - continue low sodium diet\par continue Co-Q 10 100mg p.o.q.d.\par \par Endocrinology\par hypercholesterolemia - continue Atorvastatin Calcium 40mg p.o.q.d. as directed, Rx filled and omega-3 fish oil 1000mg BID p.o.q.d. - continue low cholesterol/low fat diet - check FLP and LFTs \par \par obesity - continue low carbohydrate diet.  Advised weight loss.  advised last A1c was in a prediabetic stage.  discusses ADA diet.  Check A1c and lipids in 2 months \par continue vitamin D-3 1000 unit capsules p.o.q.d. - check vitamin D\par \par Musculoskeletal\par osteoarthritis - recommended start Osteo Bi-Flex p.o.q.d. - follow up with rheumatologist, Dr. Warner as needed\par Constitutional/Neurology\par \par gait instability - continue using cane as needed for movement outdoors \par \par Gastroenterology\par GERD - continue Omeprazole 20mg p.o.q.d., Rx filled - continue antireflux measures\par \par Addendum reviewed MAmmo from 02/19 [facility]

## 2020-03-09 ENCOUNTER — TRANSCRIPTION ENCOUNTER (OUTPATIENT)
Age: 80
End: 2020-03-09

## 2020-04-26 ENCOUNTER — RX RENEWAL (OUTPATIENT)
Age: 80
End: 2020-04-26

## 2020-05-14 ENCOUNTER — RX RENEWAL (OUTPATIENT)
Age: 80
End: 2020-05-14

## 2020-05-15 ENCOUNTER — APPOINTMENT (OUTPATIENT)
Dept: INTERNAL MEDICINE | Facility: CLINIC | Age: 80
End: 2020-05-15
Payer: MEDICARE

## 2020-05-15 PROCEDURE — 99213 OFFICE O/P EST LOW 20 MIN: CPT | Mod: 95

## 2020-05-17 NOTE — PHYSICAL EXAM
[No Acute Distress] : no acute distress [Well Nourished] : well nourished [Well Developed] : well developed [Well-Appearing] : well-appearing [Normal Sclera/Conjunctiva] : normal sclera/conjunctiva [PERRL] : pupils equal round and reactive to light [EOMI] : extraocular movements intact [Normal Outer Ear/Nose] : the outer ears and nose were normal in appearance [Normal Oropharynx] : the oropharynx was normal [No JVD] : no jugular venous distention [No Lymphadenopathy] : no lymphadenopathy [Supple] : supple [Thyroid Normal, No Nodules] : the thyroid was normal and there were no nodules present [No Respiratory Distress] : no respiratory distress  [No Accessory Muscle Use] : no accessory muscle use [Clear to Auscultation] : lungs were clear to auscultation bilaterally [Normal Rate] : normal rate  [Regular Rhythm] : with a regular rhythm [Normal S1, S2] : normal S1 and S2 [No Murmur] : no murmur heard [No Carotid Bruits] : no carotid bruits [No Abdominal Bruit] : a ~M bruit was not heard ~T in the abdomen [No Varicosities] : no varicosities [Pedal Pulses Present] : the pedal pulses are present [No Edema] : there was no peripheral edema [No Palpable Aorta] : no palpable aorta [No Extremity Clubbing/Cyanosis] : no extremity clubbing/cyanosis [Soft] : abdomen soft [Non Tender] : non-tender [Non-distended] : non-distended [No Masses] : no abdominal mass palpated [No HSM] : no HSM [Normal Bowel Sounds] : normal bowel sounds [Normal Posterior Cervical Nodes] : no posterior cervical lymphadenopathy [Normal Anterior Cervical Nodes] : no anterior cervical lymphadenopathy [No CVA Tenderness] : no CVA  tenderness [No Spinal Tenderness] : no spinal tenderness [No Joint Swelling] : no joint swelling [Grossly Normal Strength/Tone] : grossly normal strength/tone [No Rash] : no rash [Coordination Grossly Intact] : coordination grossly intact [No Focal Deficits] : no focal deficits [Normal Gait] : normal gait [Deep Tendon Reflexes (DTR)] : deep tendon reflexes were 2+ and symmetric [Normal Affect] : the affect was normal [Normal Insight/Judgement] : insight and judgment were intact [Normal] : affect was normal and insight and judgment were intact [de-identified] : obese

## 2020-05-17 NOTE — HISTORY OF PRESENT ILLNESS
[FreeTextEntry1] : telehealth\par hyperlipidemia\par  [de-identified] : Patient is a 79 year old female with a past medical history as below who presents for general follow-up. Patient states she is taking all medications as prescribed and denies any adverse reactions or side effects. She denies lightheadedness or dizziness on Metoprolol Tartrate. GERD is well-managed on Omeprazole. Patient notes seeing rheumatologist, Dr. Warner who recommended starting Glucosamine/Chondroitin for osteoarthritis in her fingers. She also notes that she is taking the "[facility] stay at home ordersa" very seriously and that she has not left her home in 2 months.  She gets all food and necessary supplies by delivery.  As such, she will not come into the office or let any technician come into her home to take her blood.  She states that she has taken all of her medications for decades and that she feels very comfortable waiting until she feels safe to get surveillance blood work performed.

## 2020-05-17 NOTE — HEALTH RISK ASSESSMENT
[] : No [No] : In the past 12 months have you used drugs other than those required for medical reasons? No [0] : 1) Little interest or pleasure doing things: Not at all (0) [1] : 2) Feeling down, depressed, or hopeless for several days (1) [[id number]] : 1

## 2020-05-17 NOTE — PLAN
[FreeTextEntry1] : Cardiology\par hypertension - continue Metoprolol Tartrate 25mg BID p.o.q.d., - continue low sodium diet\par continue Co-Q 10 100mg p.o.q.d.\par Endocrinology\par hypercholesterolemia - continue Atorvastatin Calcium 40mg p.o.q.d. as directed, Rx filled and omega-3 fish oil 1000mg BID p.o.q.d. - continue low cholesterol/low fat diet - check FLP and LFTs when feeling safe for live appointment \par obesity - continue low carbohydrate diet\par continue vitamin D-3 1000 unit capsules p.o.q.d. - check vitamin D\par Musculoskeletal\par osteoarthritis - recommended start Osteo Bi-Flex p.o.q.d. - follow up with rheumatologist, Dr. Warner as needed\par Constitutional/Neurology\par gait instability - continue using cane as needed for movement outdoors \par Gastroenterology\par GERD - continue Omeprazole 20mg p.o.q.d., Rx filled - continue antireflux measures\par

## 2020-05-17 NOTE — ASSESSMENT
[FreeTextEntry1] : Patient is a 78 year old female with a past medical history as above who presents virtually for general follow-up.\par

## 2020-05-17 NOTE — DISCUSSION/SUMMARY
[FreeTextEntry1] : discussed bw with patient-for hypertriglyceridemia she will lower her fat and sugar intake which should also help with the slight increase in ac-repeat FBW in 3 months\par AK

## 2020-07-22 ENCOUNTER — RX RENEWAL (OUTPATIENT)
Age: 80
End: 2020-07-22

## 2020-09-10 ENCOUNTER — RX RENEWAL (OUTPATIENT)
Age: 80
End: 2020-09-10

## 2020-12-06 ENCOUNTER — RX RENEWAL (OUTPATIENT)
Age: 80
End: 2020-12-06

## 2020-12-11 ENCOUNTER — APPOINTMENT (OUTPATIENT)
Dept: INTERNAL MEDICINE | Facility: CLINIC | Age: 80
End: 2020-12-11

## 2020-12-24 ENCOUNTER — RX RENEWAL (OUTPATIENT)
Age: 80
End: 2020-12-24

## 2020-12-27 ENCOUNTER — RX RENEWAL (OUTPATIENT)
Age: 80
End: 2020-12-27

## 2021-03-04 ENCOUNTER — RX RENEWAL (OUTPATIENT)
Age: 81
End: 2021-03-04

## 2021-03-24 ENCOUNTER — APPOINTMENT (OUTPATIENT)
Dept: INTERNAL MEDICINE | Facility: CLINIC | Age: 81
End: 2021-03-24
Payer: MEDICARE

## 2021-03-24 ENCOUNTER — LABORATORY RESULT (OUTPATIENT)
Age: 81
End: 2021-03-24

## 2021-03-24 VITALS
OXYGEN SATURATION: 95 % | DIASTOLIC BLOOD PRESSURE: 82 MMHG | HEIGHT: 65 IN | BODY MASS INDEX: 30.84 KG/M2 | HEART RATE: 68 BPM | WEIGHT: 185.13 LBS | SYSTOLIC BLOOD PRESSURE: 160 MMHG | RESPIRATION RATE: 16 BRPM | TEMPERATURE: 97.4 F

## 2021-03-24 VITALS — SYSTOLIC BLOOD PRESSURE: 130 MMHG | DIASTOLIC BLOOD PRESSURE: 78 MMHG

## 2021-03-24 DIAGNOSIS — R06.02 SHORTNESS OF BREATH: ICD-10-CM

## 2021-03-24 PROCEDURE — 99214 OFFICE O/P EST MOD 30 MIN: CPT | Mod: CS,25

## 2021-03-24 PROCEDURE — 36415 COLL VENOUS BLD VENIPUNCTURE: CPT

## 2021-03-24 RX ORDER — MULTIVITAMIN
TABLET ORAL
Refills: 0 | Status: ACTIVE | COMMUNITY
Start: 2021-03-24

## 2021-03-24 NOTE — HEALTH RISK ASSESSMENT
[No] : In the past 12 months have you used drugs other than those required for medical reasons? No [0] : 1) Little interest or pleasure doing things: Not at all (0) [1] : 2) Feeling down, depressed, or hopeless for several days (1) [] : No [Audit-CScore] : 0 [ETH5Nnofj] : 1 [MammogramDate] : 02/19 [MammogramComments] : BI-RADS 2: Benign findings; results of most recent imaging to be requested from [facility]. [BoneDensityDate] : 05/19 [BoneDensityComments] : Osteopenia.  [ColonoscopyDate] : 08/13 [ColonoscopyComments] : Sigmoid polyp and internal hemorrhoids.

## 2021-03-24 NOTE — ADDENDUM
[FreeTextEntry1] : I, Maxx Cevallos, acted solely as scribe for Dr. Maycol Saldana DO on this date 03/24/2021 10:00AM .\par \par All medical record entries made by the Scribe were at my, Dr. Maycol Saldana DO direction and personally dictated by me on 03/24/2021 10:00AM. I have reviewed the chart and agree that the record accurately reflects my personal performance of the history, physical exam, assessment and plan. I have also personally directed, reviewed and agreed with the chart.\par

## 2021-03-24 NOTE — REVIEW OF SYSTEMS
[Negative] : Heme/Lymph [Fatigue] : fatigue [Shortness Of Breath] : shortness of breath [FreeTextEntry5] : discoloration of feet  [FreeTextEntry9] : gait instability; hip and knee discomfort [de-identified] : pins/needles sensation in fingers and toes; RLE weaker than LLE

## 2021-03-24 NOTE — ASSESSMENT
[FreeTextEntry1] : Patient is a 80 year old female with a past medical history as above who presents for fasting blood work and general follow-up.\par

## 2021-03-24 NOTE — PLAN
[FreeTextEntry1] : Pulmonary\par SOB - referred to cardiologist, Dr. Schaffer for further evaluation/testing-likely secondary to significant deconditioning from not leaving her home in approximately 1 year \par Cardiology\par hypertension - continue Metoprolol Tartrate 25mg BID p.o.q.d. as directed - continue low sodium diet and weight loss; will continue to monitor BP\par hypercholesterolemia - continue Atorvastatin Calcium 40mg p.o.q.d. - continue low cholesterol/low fat diet - check FLP and LFTs\par hypertriglyceridemia - continue Omega-3 Fish Oil 1000mg 2 tablets BID p.o.q.d. as directed - continue low cholesterol/low fat and low carbohydrate/low sugar diet - check FLP and LFTs\par Endocrinology\par obesity - continue low carbohydrate diet; recommended gradually increasing CV exercise, check A1C\par hyperglycemia - continue low carbohydrate/low sugar diet - check hemoglobin A1C\par continue Vitamin D-3 1000 IU p.o.q.d. with meals as directed - check Vitamin D \par Gynecology\par Results of most recent breast imaging to be requested\par Musculoskeletal\par gait instability - Rx given for PT, likely secondary to deconditioning\par Neurology\par pins/needles sensation in fingers and toes - likely due to nerve impingement from sleeping in an awkward position\par Referred to neurologist, Dr. Sherman \par Gastroenterology\par GERD - continue Omeprazole 20mg p.o.q.d. - continue antireflux measures\par Podiatry\par Follow up with podiatrist, Dr. Lennon to have toenails cut and for foot hygiene\par Urology\par check UA w/ Reflex Urine Culture - referred to urologist, Dr. Calderon\par Infectious Disease\par check COVID-19 Nucleocapsid Antibody \par \par check female panel, hemoglobin A1C, COVID-19 Nucleocapsid Antibody, and UA

## 2021-03-24 NOTE — HISTORY OF PRESENT ILLNESS
[FreeTextEntry1] : fasting blood work and general follow-up\par  [de-identified] : Patient is a 80 year old female with a past medical history as below who presents for fasting blood work and general follow-up. Patient states she is taking all medications as prescribed and denies any adverse reactions or side effects. She denies lightheadedness or dizziness on Metoprolol Tartrate. GERD is well-managed on Omeprazole. Patient notes having breast imaging several months ago. Patient notes SOB and having less energy than usual. She inquires about a referral to a cardiologist. Patient notes bilateral hip and knee discomfort. She states her RLE has felt weaker than the LLE, most noticeable when walking upstairs. She notes gait instability. Patient notes a pins/needles sensation in her fingers and toes when she wakes up. She states the sensation resolves with movement. She inquires about a referral to a new neurologist. Patient notes mild discoloration of the feet. She has not seen podiatrist, Dr. Lennon in the past year. Patient inquires about a referral to a urologist. Patient has received both doses of the COVID-19 Vaccine. She noted some upper extremity erythema shortly after the vaccine was administered. She inquires about testing for COVID-19 antibodies with blood work today.  Of note, she essentially did not leave her house for almost 1 year secondary to the covid pandemic.  As a result, her physical activity has been very limited as well as her cardiopulmonary exercise.

## 2021-03-25 ENCOUNTER — LABORATORY RESULT (OUTPATIENT)
Age: 81
End: 2021-03-25

## 2021-04-01 ENCOUNTER — NON-APPOINTMENT (OUTPATIENT)
Age: 81
End: 2021-04-01

## 2021-04-01 LAB
25(OH)D3 SERPL-MCNC: 29.9 NG/ML
ALBUMIN SERPL ELPH-MCNC: 4.4 G/DL
ALP BLD-CCNC: 82 U/L
ALT SERPL-CCNC: 29 U/L
ANION GAP SERPL CALC-SCNC: 14 MMOL/L
APPEARANCE: CLEAR
AST SERPL-CCNC: 26 U/L
BASOPHILS # BLD AUTO: 0.03 K/UL
BASOPHILS NFR BLD AUTO: 0.6 %
BILIRUB SERPL-MCNC: 0.6 MG/DL
BILIRUBIN URINE: NEGATIVE
BLOOD URINE: NEGATIVE
BUN SERPL-MCNC: 16 MG/DL
CALCIUM SERPL-MCNC: 9.5 MG/DL
CHLORIDE SERPL-SCNC: 104 MMOL/L
CHOLEST SERPL-MCNC: 179 MG/DL
CO2 SERPL-SCNC: 24 MMOL/L
COLOR: NORMAL
COVID-19 NUCLEOCAPSID  GAM ANTIBODY INTERPRETATION: NEGATIVE
CREAT SERPL-MCNC: 0.78 MG/DL
EOSINOPHIL # BLD AUTO: 0.16 K/UL
EOSINOPHIL NFR BLD AUTO: 3.1 %
ESTIMATED AVERAGE GLUCOSE: 120 MG/DL
GLUCOSE QUALITATIVE U: NEGATIVE
GLUCOSE SERPL-MCNC: 99 MG/DL
HBA1C MFR BLD HPLC: 5.8 %
HCT VFR BLD CALC: 41 %
HDLC SERPL-MCNC: 43 MG/DL
HGB BLD-MCNC: 13 G/DL
IMM GRANULOCYTES NFR BLD AUTO: 0.2 %
KETONES URINE: NEGATIVE
LDLC SERPL CALC-MCNC: 97 MG/DL
LEUKOCYTE ESTERASE URINE: ABNORMAL
LYMPHOCYTES # BLD AUTO: 1.87 K/UL
LYMPHOCYTES NFR BLD AUTO: 36.5 %
MAN DIFF?: NORMAL
MCHC RBC-ENTMCNC: 29.2 PG
MCHC RBC-ENTMCNC: 31.7 GM/DL
MCV RBC AUTO: 92.1 FL
MONOCYTES # BLD AUTO: 0.49 K/UL
MONOCYTES NFR BLD AUTO: 9.6 %
NEUTROPHILS # BLD AUTO: 2.56 K/UL
NEUTROPHILS NFR BLD AUTO: 50 %
NITRITE URINE: NEGATIVE
NONHDLC SERPL-MCNC: 136 MG/DL
PH URINE: 6
PLATELET # BLD AUTO: 237 K/UL
POTASSIUM SERPL-SCNC: 4.3 MMOL/L
PROT SERPL-MCNC: 7.3 G/DL
PROTEIN URINE: NEGATIVE
RBC # BLD: 4.45 M/UL
RBC # FLD: 15 %
SARS-COV-2 AB SERPL QL IA: 0.07 INDEX
SODIUM SERPL-SCNC: 142 MMOL/L
SPECIFIC GRAVITY URINE: 1.02
TRIGL SERPL-MCNC: 196 MG/DL
TSH SERPL-ACNC: 3.39 UIU/ML
UROBILINOGEN URINE: NORMAL
WBC # FLD AUTO: 5.12 K/UL

## 2021-05-01 ENCOUNTER — OUTPATIENT (OUTPATIENT)
Dept: OUTPATIENT SERVICES | Facility: HOSPITAL | Age: 81
LOS: 1 days | Discharge: HOME | End: 2021-05-01

## 2021-05-01 DIAGNOSIS — C4A.9 MERKEL CELL CARCINOMA, UNSPECIFIED: ICD-10-CM

## 2021-05-04 ENCOUNTER — APPOINTMENT (OUTPATIENT)
Dept: HEMATOLOGY ONCOLOGY | Facility: CLINIC | Age: 81
End: 2021-05-04
Payer: MEDICARE

## 2021-05-04 ENCOUNTER — RESULT REVIEW (OUTPATIENT)
Age: 81
End: 2021-05-04

## 2021-05-04 VITALS
SYSTOLIC BLOOD PRESSURE: 151 MMHG | WEIGHT: 185.19 LBS | OXYGEN SATURATION: 94 % | BODY MASS INDEX: 32.01 KG/M2 | HEIGHT: 63.78 IN | TEMPERATURE: 97.6 F | RESPIRATION RATE: 16 BRPM | DIASTOLIC BLOOD PRESSURE: 81 MMHG | HEART RATE: 62 BPM

## 2021-05-04 DIAGNOSIS — Z63.4 DISAPPEARANCE AND DEATH OF FAMILY MEMBER: ICD-10-CM

## 2021-05-04 DIAGNOSIS — C43.72 MALIGNANT MELANOMA OF LEFT LOWER LIMB, INCLUDING HIP: ICD-10-CM

## 2021-05-04 PROCEDURE — 99204 OFFICE O/P NEW MOD 45 MIN: CPT

## 2021-05-04 SDOH — SOCIAL STABILITY - SOCIAL INSECURITY: DISSAPEARANCE AND DEATH OF FAMILY MEMBER: Z63.4

## 2021-05-04 NOTE — CONSULT LETTER
[Dear  ___] : Dear  [unfilled], [Consult Letter:] : I had the pleasure of evaluating your patient, [unfilled]. [Please see my note below.] : Please see my note below. [Consult Closing:] : Thank you very much for allowing me to participate in the care of this patient.  If you have any questions, please do not hesitate to contact me. [Sincerely,] : Sincerely, [DrFadia  ___] : Dr. CONNOR [DrFadia ___] : Dr. CONNOR

## 2021-05-04 NOTE — REASON FOR VISIT
[Initial Consultation] : an initial consultation [Family Member] : family member [FreeTextEntry2] : Merkel Cell CA

## 2021-05-04 NOTE — HISTORY OF PRESENT ILLNESS
[Disease: _____________________] : Disease: [unfilled] [T: ___] : T[unfilled] [N: ___] : N[unfilled] [M: ___] : M[unfilled] [AJCC Stage: ____] : AJCC Stage: [unfilled] [de-identified] : Mrs. Randolph is a 80 year old female with past medical history of HTN, HLD, GERD, sleep apnea presenting to the office for an initial consultation of Merkel cell CA.\par \par Patient noticed a small pimple on the border of her left nares since at least October 2020.\par Later, she sought attention for this.\par She underwent biopsy of the left inner nostril with her dermatologist (Dr. Brooke Wu) on 4/21/2021 which demonstrated Merkel cell CA\par The pathology report has been reviewed, and I discussed with Dr. Wu.\par The lesion is small (~2 x 1 mm) and there is a positive deep margin.\par Patient and son and I discussed possible re-excision with Dr. Mckeon and adjuvant or primary radiation.\par \par Prior Melanoma:\par On 10/21/2016 underwent wide excision of an uncomplicated 0.6 mm melanoma from the left calf, with negative margins with Dr. Quinten Mckeon and reconstruction by Dr. Nichole\par This was an asymptomatic pigmented lesion discovered on routine skin and evaluation.\par Pathology: T1a [de-identified] : Merkel cell carcinoma [de-identified] : Dermatology: Brooke Wu\par Internist: Maycol Saldana\par Rheumatology: Estela Warner\par Surgical Oncology: Quinten Mckeon\par Radiation Oncology: Jess Diaz\par \par Son: Mart [phone]\par Other son: Rex

## 2021-05-04 NOTE — PHYSICAL EXAM
[Restricted in physically strenuous activity but ambulatory and able to carry out work of a light or sedentary nature] : Status 1- Restricted in physically strenuous activity but ambulatory and able to carry out work of a light or sedentary nature, e.g., light house work, office work [Normal] : affect appropriate [de-identified] : no lesions on nose or face

## 2021-05-06 ENCOUNTER — OUTPATIENT (OUTPATIENT)
Dept: OUTPATIENT SERVICES | Facility: HOSPITAL | Age: 81
LOS: 1 days | Discharge: HOME | End: 2021-05-06

## 2021-05-07 ENCOUNTER — APPOINTMENT (OUTPATIENT)
Dept: NUCLEAR MEDICINE | Facility: CLINIC | Age: 81
End: 2021-05-07
Payer: MEDICARE

## 2021-05-07 ENCOUNTER — OUTPATIENT (OUTPATIENT)
Dept: OUTPATIENT SERVICES | Facility: HOSPITAL | Age: 81
LOS: 1 days | End: 2021-05-07
Payer: MEDICARE

## 2021-05-07 DIAGNOSIS — C4A.30 MERKEL CELL CARCINOMA OF UNSPECIFIED PART OF FACE: ICD-10-CM

## 2021-05-07 PROCEDURE — 78816 PET IMAGE W/CT FULL BODY: CPT

## 2021-05-07 PROCEDURE — 78816 PET IMAGE W/CT FULL BODY: CPT | Mod: 26,PI,MH

## 2021-05-07 PROCEDURE — A9552: CPT

## 2021-05-10 ENCOUNTER — APPOINTMENT (OUTPATIENT)
Dept: SURGICAL ONCOLOGY | Facility: CLINIC | Age: 81
End: 2021-05-10
Payer: MEDICARE

## 2021-05-10 ENCOUNTER — APPOINTMENT (OUTPATIENT)
Dept: RADIATION ONCOLOGY | Facility: CLINIC | Age: 81
End: 2021-05-10
Payer: MEDICARE

## 2021-05-10 VITALS
TEMPERATURE: 96.4 F | DIASTOLIC BLOOD PRESSURE: 84 MMHG | SYSTOLIC BLOOD PRESSURE: 161 MMHG | HEART RATE: 54 BPM | WEIGHT: 184.53 LBS | BODY MASS INDEX: 31.89 KG/M2 | HEIGHT: 63.78 IN | RESPIRATION RATE: 16 BRPM | OXYGEN SATURATION: 97 %

## 2021-05-10 VITALS
BODY MASS INDEX: 32.6 KG/M2 | RESPIRATION RATE: 15 BRPM | HEIGHT: 63 IN | OXYGEN SATURATION: 97 % | HEART RATE: 55 BPM | DIASTOLIC BLOOD PRESSURE: 80 MMHG | WEIGHT: 184 LBS | SYSTOLIC BLOOD PRESSURE: 159 MMHG

## 2021-05-10 PROCEDURE — 99204 OFFICE O/P NEW MOD 45 MIN: CPT | Mod: 25

## 2021-05-10 PROCEDURE — 99205 OFFICE O/P NEW HI 60 MIN: CPT

## 2021-05-12 NOTE — HISTORY OF PRESENT ILLNESS
[FreeTextEntry1] : Mrs. Randolph is a 80 year old female with past medical history of HTN, HLD, GERD, sleep apnea presenting to the office for an initial consultation of Merkel cell carcinoma.\par Patient noticed a small pimple on the border of her left nares since at least October 2020.  Later, she sought attention for this.\par She underwent biopsy of the left inner nostril with her dermatologist (Dr. Brooke Wu) on 4/21/2021 which demonstrated Merkel cell ca.\par She has a history of prior Melanoma on 10/21/2016 underwent wide excision of an uncomplicated 0.6 mm melanoma from the left calf, with negative margins with Dr. Quinten Mckeon and reconstruction by Dr. Nichole\par This was an asymptomatic pigmented lesion discovered on routine skin and evaluation.\par Pathology: T1a. \par

## 2021-05-12 NOTE — REVIEW OF SYSTEMS
[Dry Eyes] : dryness of the eyes [Difficulty Walking] : difficulty walking [Negative] : Allergic/Immunologic [Eye Pain] : no eye pain [Red Eyes] : eyes not red [Visual Disturbances] : no visual disturbances [Confused] : no confusion [Dizziness] : no dizziness [Fainting] : no fainting

## 2021-05-24 ENCOUNTER — APPOINTMENT (OUTPATIENT)
Dept: PLASTIC SURGERY | Facility: CLINIC | Age: 81
End: 2021-05-24
Payer: MEDICARE

## 2021-05-24 ENCOUNTER — RESULT REVIEW (OUTPATIENT)
Age: 81
End: 2021-05-24

## 2021-05-24 VITALS
HEIGHT: 63 IN | TEMPERATURE: 98 F | OXYGEN SATURATION: 95 % | WEIGHT: 185 LBS | DIASTOLIC BLOOD PRESSURE: 76 MMHG | HEART RATE: 70 BPM | SYSTOLIC BLOOD PRESSURE: 175 MMHG | BODY MASS INDEX: 32.78 KG/M2

## 2021-05-24 PROCEDURE — 99204 OFFICE O/P NEW MOD 45 MIN: CPT

## 2021-05-24 NOTE — CONSULT LETTER
[Dear  ___] : Dear  [unfilled], [Consult Letter:] : I had the pleasure of evaluating your patient, [unfilled]. [Please see my note below.] : Please see my note below. [Consult Closing:] : Thank you very much for allowing me to participate in the care of this patient.  If you have any questions, please do not hesitate to contact me. [Sincerely,] : Sincerely, [FreeTextEntry3] : Dustin Owen MD, FACS

## 2021-05-24 NOTE — REASON FOR VISIT
[Consultation] : a consultation visit [Family Member] : family member [FreeTextEntry1] : Dr. Quinten Mckeon (Surgical Oncology)

## 2021-05-27 ENCOUNTER — OUTPATIENT (OUTPATIENT)
Dept: OUTPATIENT SERVICES | Facility: HOSPITAL | Age: 81
LOS: 1 days | End: 2021-05-27
Payer: MEDICARE

## 2021-05-27 VITALS
RESPIRATION RATE: 14 BRPM | HEART RATE: 55 BPM | TEMPERATURE: 98 F | OXYGEN SATURATION: 97 % | DIASTOLIC BLOOD PRESSURE: 78 MMHG | WEIGHT: 176.37 LBS | SYSTOLIC BLOOD PRESSURE: 168 MMHG | HEIGHT: 64 IN

## 2021-05-27 DIAGNOSIS — Z98.890 OTHER SPECIFIED POSTPROCEDURAL STATES: Chronic | ICD-10-CM

## 2021-05-27 DIAGNOSIS — C4A.30 MERKEL CELL CARCINOMA OF UNSPECIFIED PART OF FACE: ICD-10-CM

## 2021-05-27 DIAGNOSIS — M19.90 UNSPECIFIED OSTEOARTHRITIS, UNSPECIFIED SITE: Chronic | ICD-10-CM

## 2021-05-27 DIAGNOSIS — M48.061 SPINAL STENOSIS, LUMBAR REGION WITHOUT NEUROGENIC CLAUDICATION: Chronic | ICD-10-CM

## 2021-05-27 DIAGNOSIS — C4A.9 MERKEL CELL CARCINOMA, UNSPECIFIED: ICD-10-CM

## 2021-05-27 DIAGNOSIS — Z01.818 ENCOUNTER FOR OTHER PREPROCEDURAL EXAMINATION: ICD-10-CM

## 2021-05-27 LAB
ANION GAP SERPL CALC-SCNC: 6 MMOL/L — SIGNIFICANT CHANGE UP (ref 5–17)
BUN SERPL-MCNC: 16 MG/DL — SIGNIFICANT CHANGE UP (ref 7–23)
CALCIUM SERPL-MCNC: 9.1 MG/DL — SIGNIFICANT CHANGE UP (ref 8.4–10.5)
CHLORIDE SERPL-SCNC: 105 MMOL/L — SIGNIFICANT CHANGE UP (ref 96–108)
CO2 SERPL-SCNC: 29 MMOL/L — SIGNIFICANT CHANGE UP (ref 22–31)
CREAT SERPL-MCNC: 0.87 MG/DL — SIGNIFICANT CHANGE UP (ref 0.5–1.3)
GLUCOSE SERPL-MCNC: 112 MG/DL — HIGH (ref 70–99)
HCT VFR BLD CALC: 39.8 % — SIGNIFICANT CHANGE UP (ref 34.5–45)
HGB BLD-MCNC: 13.3 G/DL — SIGNIFICANT CHANGE UP (ref 11.5–15.5)
MCHC RBC-ENTMCNC: 30 PG — SIGNIFICANT CHANGE UP (ref 27–34)
MCHC RBC-ENTMCNC: 33.4 GM/DL — SIGNIFICANT CHANGE UP (ref 32–36)
MCV RBC AUTO: 89.6 FL — SIGNIFICANT CHANGE UP (ref 80–100)
NRBC # BLD: 0 /100 WBCS — SIGNIFICANT CHANGE UP (ref 0–0)
PLATELET # BLD AUTO: 248 K/UL — SIGNIFICANT CHANGE UP (ref 150–400)
POTASSIUM SERPL-MCNC: 4.4 MMOL/L — SIGNIFICANT CHANGE UP (ref 3.5–5.3)
POTASSIUM SERPL-SCNC: 4.4 MMOL/L — SIGNIFICANT CHANGE UP (ref 3.5–5.3)
RBC # BLD: 4.44 M/UL — SIGNIFICANT CHANGE UP (ref 3.8–5.2)
RBC # FLD: 14.4 % — SIGNIFICANT CHANGE UP (ref 10.3–14.5)
SODIUM SERPL-SCNC: 140 MMOL/L — SIGNIFICANT CHANGE UP (ref 135–145)
WBC # BLD: 6.77 K/UL — SIGNIFICANT CHANGE UP (ref 3.8–10.5)
WBC # FLD AUTO: 6.77 K/UL — SIGNIFICANT CHANGE UP (ref 3.8–10.5)

## 2021-05-27 PROCEDURE — 36415 COLL VENOUS BLD VENIPUNCTURE: CPT

## 2021-05-27 PROCEDURE — G0463: CPT

## 2021-05-27 PROCEDURE — 85027 COMPLETE CBC AUTOMATED: CPT

## 2021-05-27 PROCEDURE — 93005 ELECTROCARDIOGRAM TRACING: CPT

## 2021-05-27 PROCEDURE — 93010 ELECTROCARDIOGRAM REPORT: CPT | Mod: NC

## 2021-05-27 PROCEDURE — 80048 BASIC METABOLIC PNL TOTAL CA: CPT

## 2021-05-27 NOTE — H&P PST ADULT - NSICDXPASTMEDICALHX_GEN_ALL_CORE_FT
PAST MEDICAL HISTORY:  COVID-19 vaccine series completed moderna, completed 3/4/21    GERD (gastroesophageal reflux disease)     Hearing loss right ear    Hyperlipidemia     Hypertension     Lipodermatosclerosis diagnosed 2010    Melanoma left calf    Merkel cell carcinoma left nares    Obesity (BMI 30.0-34.9)     KEVEN (obstructive sleep apnea) diagnosed > 10 years ago and does not use any treatment    Skin cancer face, unsure type 1990

## 2021-05-27 NOTE — H&P PST ADULT - NSICDXFAMILYHX_GEN_ALL_CORE_FT
FAMILY HISTORY:  Father  Still living? No  Family history of heart attack, Age at diagnosis: Age Unknown  Family history of type 2 diabetes mellitus, Age at diagnosis: Age Unknown    Mother  Still living? No  Family history of lung cancer, Age at diagnosis: Age Unknown    Sibling  Still living? Yes, Estimated age: 71-80  Family history of coronary artery disease, Age at diagnosis: Age Unknown

## 2021-05-27 NOTE — H&P PST ADULT - NSICDXPASTSURGICALHX_GEN_ALL_CORE_FT
PAST SURGICAL HISTORY:  H/O melanoma excision     H/O umbilical hernia repair 2012    Lumbar spinal stenosis     Osteoarthritis     S/P cataract extraction 2007, 2012    Status post Mohs surgery face, 1990

## 2021-05-27 NOTE — H&P PST ADULT - NSICDXPROBLEM_GEN_ALL_CORE_FT
PROBLEM DIAGNOSES  Problem: Merkel cell carcinoma  Assessment and Plan: wide excision merkel cell cancer left nares, left neck sentinel lymph node biopsy. medical clearance requested. instructed to take metoprolol and omeprazole AM of surgery with sips of water. surgical wash instructions reviewed and verbalized understanding.

## 2021-05-27 NOTE — H&P PST ADULT - HISTORY OF PRESENT ILLNESS
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 Denies pain or drainage from the area.

## 2021-05-27 NOTE — H&P PST ADULT - NSANTHOSAYNRD_GEN_A_CORE
neck 16.25 inches/No. KEVEN screening performed.  STOP BANG Legend: 0-2 = LOW Risk; 3-4 = INTERMEDIATE Risk; 5-8 = HIGH Risk

## 2021-05-27 NOTE — H&P PST ADULT - ATTENDING COMMENTS
80-year-old lady with merkel cell cancer of the left naris scheduled for excision with sentinel node biopsy and reconstruction by Dr. Dustin Owen.    Discussed with her and her son my office, and again the morning of operation.    All questions answered, consent on chart

## 2021-06-03 ENCOUNTER — APPOINTMENT (OUTPATIENT)
Dept: INTERNAL MEDICINE | Facility: CLINIC | Age: 81
End: 2021-06-03
Payer: MEDICARE

## 2021-06-03 ENCOUNTER — NON-APPOINTMENT (OUTPATIENT)
Age: 81
End: 2021-06-03

## 2021-06-03 VITALS — DIASTOLIC BLOOD PRESSURE: 80 MMHG | SYSTOLIC BLOOD PRESSURE: 150 MMHG

## 2021-06-03 VITALS
DIASTOLIC BLOOD PRESSURE: 88 MMHG | OXYGEN SATURATION: 97 % | HEART RATE: 63 BPM | SYSTOLIC BLOOD PRESSURE: 178 MMHG | BODY MASS INDEX: 32.43 KG/M2 | HEIGHT: 63 IN | RESPIRATION RATE: 16 BRPM | TEMPERATURE: 97.5 F | WEIGHT: 183 LBS

## 2021-06-03 PROCEDURE — 93000 ELECTROCARDIOGRAM COMPLETE: CPT

## 2021-06-03 PROCEDURE — 99214 OFFICE O/P EST MOD 30 MIN: CPT | Mod: 25

## 2021-06-03 NOTE — REVIEW OF SYSTEMS
[Negative] : Heme/Lymph [Dyspnea on Exertion] : dyspnea on exertion [de-identified] : skin cancer of inner left nostril

## 2021-06-03 NOTE — PHYSICAL EXAM
[No Acute Distress] : no acute distress [Well Nourished] : well nourished [Well Developed] : well developed [Well-Appearing] : well-appearing [Normal Sclera/Conjunctiva] : normal sclera/conjunctiva [PERRL] : pupils equal round and reactive to light [EOMI] : extraocular movements intact [Normal Outer Ear/Nose] : the outer ears and nose were normal in appearance [Normal Oropharynx] : the oropharynx was normal [No JVD] : no jugular venous distention [No Lymphadenopathy] : no lymphadenopathy [Supple] : supple [Thyroid Normal, No Nodules] : the thyroid was normal and there were no nodules present [No Respiratory Distress] : no respiratory distress  [No Accessory Muscle Use] : no accessory muscle use [Clear to Auscultation] : lungs were clear to auscultation bilaterally [Normal Rate] : normal rate  [Regular Rhythm] : with a regular rhythm [Normal S1, S2] : normal S1 and S2 [No Murmur] : no murmur heard [No Carotid Bruits] : no carotid bruits [No Abdominal Bruit] : a ~M bruit was not heard ~T in the abdomen [No Varicosities] : no varicosities [Pedal Pulses Present] : the pedal pulses are present [No Edema] : there was no peripheral edema [No Palpable Aorta] : no palpable aorta [No Extremity Clubbing/Cyanosis] : no extremity clubbing/cyanosis [Soft] : abdomen soft [Non Tender] : non-tender [Non-distended] : non-distended [No Masses] : no abdominal mass palpated [No HSM] : no HSM [Normal Bowel Sounds] : normal bowel sounds [Normal Posterior Cervical Nodes] : no posterior cervical lymphadenopathy [Normal Anterior Cervical Nodes] : no anterior cervical lymphadenopathy [No CVA Tenderness] : no CVA  tenderness [No Spinal Tenderness] : no spinal tenderness [No Joint Swelling] : no joint swelling [Grossly Normal Strength/Tone] : grossly normal strength/tone [No Rash] : no rash [Coordination Grossly Intact] : coordination grossly intact [No Focal Deficits] : no focal deficits [Normal Gait] : normal gait [Deep Tendon Reflexes (DTR)] : deep tendon reflexes were 2+ and symmetric [Normal Affect] : the affect was normal [Normal Insight/Judgement] : insight and judgment were intact [de-identified] : obese

## 2021-06-03 NOTE — ADDENDUM
[FreeTextEntry1] : I, Maxx Cevallos, acted solely as scribe for Dr. Maycol Saldana DO on this date 06/03/2021 11:10AM .\par \par All medical record entries made by the Scribe were at my, Dr. Maycol Saldana DO direction and personally dictated by me on 06/03/2021 11:10AM. I have reviewed the chart and agree that the record accurately reflects my personal performance of the history, physical exam, assessment and plan. I have also personally directed, reviewed and agreed with the chart.\par

## 2021-06-03 NOTE — PLAN
[FreeTextEntry1] : 1. Preoperative EKG performed - results are noted above; results of pre-operative blood work are also noted above.\par 2. The patient was instructed to stop eating prior to midnight the evening before the procedure.\par 3. The patient was advised to stop Multivitamin and Omega-3 Fish Oil today. She was advised to stop all other vitamins/supplements today. She was advised to take Metoprolol Tartrate and Omeprazole on the morning of the procedure with sips of water if normally taken at that time. She was advised to stop all other medications 1 day prior to the scheduled date of the procedure.\par 4. There is no medical contraindication to the planned procedure and the patient is therefore medically optimized/cleared for the procedure; recommended patient's pulmonary function be closely monitored as KEVEN is not currently being treated; BP acceptable for surgery: elevated BP readings today are likely secondary to anxiety regarding recent diagnosis and upcoming procedure

## 2021-06-03 NOTE — RESULTS/DATA
[] : results reviewed [de-identified] : Normal.  [de-identified] : BMP normal except mildly elevated blood-glucose (112). [de-identified] : EKG shows sinus bradycardia at 54 BPM.

## 2021-06-03 NOTE — ASSESSMENT
[Patient Optimized for Surgery] : Patient optimized for surgery [Modify anticoagulant treatment prior to procedure] : Modify anticoagulant treatment prior to procedure [Modify medications prior to procedure] : Modify medications prior to procedure [No Further Testing Recommended] : no further testing recommended [FreeTextEntry4] : The patient is a 80 year-old female who is a low risk surgical patient with adequate functional capacity going for an intermediate risk surgical procedure.  [FreeTextEntry5] : Stop Multivitamin and Omega-3 Fish Oil today. [FreeTextEntry7] : Advised to take Metoprolol Tartrate and Omeprazole on the morning of the procedure with sips of water if normally taken at that time; advised to stop all other medications 1 day prior to the scheduled date of the procedure; advised to stop all vitamins/supplements today.

## 2021-06-03 NOTE — HISTORY OF PRESENT ILLNESS
[No Pertinent Cardiac History] : no history of aortic stenosis, atrial fibrillation, coronary artery disease, recent myocardial infarction, or implantable device/pacemaker [Sleep Apnea] : sleep apnea [No Adverse Anesthesia Reaction] : no adverse anesthesia reaction in self or family member [(Patient denies any chest pain, claudication, dyspnea on exertion, orthopnea, palpitations or syncope)] : Patient denies any chest pain, claudication, dyspnea on exertion, orthopnea, palpitations or syncope [Anticoagulants: _____] : Anticoagulants: [unfilled] [Moderate (4-6 METs)] : Moderate (4-6 METs) [Asthma] : no asthma [COPD] : no COPD [Smoker] : not a smoker [Chronic Anticoagulation] : no chronic anticoagulation [Chronic Kidney Disease] : no chronic kidney disease [Diabetes] : no diabetes [FreeTextEntry1] : excision of Merkel cell carcinoma and reconstruction [FreeTextEntry2] : 6/9/21 [FreeTextEntry3] : Dr. Quinten Mckeon, Dr. Dustin Owen [FreeTextEntry4] : Patient is a 80 year-old female with a past medical history as below who presents for preoperative evaluation prior to excision of Merkel cell carcinoma and subsequent reconstruction. The excision will be performed by Dr. Quinten Mckeon at [facility]. Reconstruction will be performed by Dr. Dustin Owen. The patient has no history of allergy or adverse reaction to anesthesia. The patient can walk 2 blocks and can walk up 1-2 flights of stairs without chest pain or palpitations. She notes history of SOB on exertion. KEVEN is not currently being treated. Pre-surgical blood work was done at [facility] on 5/27/21 (results noted below).

## 2021-06-08 ENCOUNTER — TRANSCRIPTION ENCOUNTER (OUTPATIENT)
Age: 81
End: 2021-06-08

## 2021-06-08 PROBLEM — E66.9 OBESITY, UNSPECIFIED: Chronic | Status: ACTIVE | Noted: 2021-05-27

## 2021-06-08 PROBLEM — C43.9 MALIGNANT MELANOMA OF SKIN, UNSPECIFIED: Chronic | Status: ACTIVE | Noted: 2021-05-27

## 2021-06-08 PROBLEM — H91.90 UNSPECIFIED HEARING LOSS, UNSPECIFIED EAR: Chronic | Status: ACTIVE | Noted: 2021-05-27

## 2021-06-08 PROBLEM — Z92.29 PERSONAL HISTORY OF OTHER DRUG THERAPY: Chronic | Status: ACTIVE | Noted: 2021-05-27

## 2021-06-08 PROBLEM — C44.90 UNSPECIFIED MALIGNANT NEOPLASM OF SKIN, UNSPECIFIED: Chronic | Status: ACTIVE | Noted: 2021-05-27

## 2021-06-08 PROBLEM — C4A.9 MERKEL CELL CARCINOMA, UNSPECIFIED: Chronic | Status: ACTIVE | Noted: 2021-05-27

## 2021-06-08 PROBLEM — I83.10 VARICOSE VEINS OF UNSPECIFIED LOWER EXTREMITY WITH INFLAMMATION: Chronic | Status: ACTIVE | Noted: 2021-05-27

## 2021-06-08 PROBLEM — K21.9 GASTRO-ESOPHAGEAL REFLUX DISEASE WITHOUT ESOPHAGITIS: Chronic | Status: ACTIVE | Noted: 2021-05-27

## 2021-06-09 ENCOUNTER — OUTPATIENT (OUTPATIENT)
Dept: OUTPATIENT SERVICES | Facility: HOSPITAL | Age: 81
LOS: 1 days | Discharge: HOME | End: 2021-06-09
Payer: MEDICARE

## 2021-06-09 ENCOUNTER — APPOINTMENT (OUTPATIENT)
Dept: SURGICAL ONCOLOGY | Facility: HOSPITAL | Age: 81
End: 2021-06-09

## 2021-06-09 ENCOUNTER — RESULT REVIEW (OUTPATIENT)
Age: 81
End: 2021-06-09

## 2021-06-09 ENCOUNTER — APPOINTMENT (OUTPATIENT)
Dept: PLASTIC SURGERY | Facility: HOSPITAL | Age: 81
End: 2021-06-09
Payer: MEDICARE

## 2021-06-09 VITALS
OXYGEN SATURATION: 96 % | SYSTOLIC BLOOD PRESSURE: 177 MMHG | TEMPERATURE: 98 F | HEIGHT: 64 IN | HEART RATE: 60 BPM | WEIGHT: 176.37 LBS | RESPIRATION RATE: 15 BRPM | DIASTOLIC BLOOD PRESSURE: 73 MMHG

## 2021-06-09 VITALS
RESPIRATION RATE: 15 BRPM | DIASTOLIC BLOOD PRESSURE: 72 MMHG | OXYGEN SATURATION: 94 % | HEART RATE: 74 BPM | SYSTOLIC BLOOD PRESSURE: 136 MMHG

## 2021-06-09 DIAGNOSIS — C4A.30 MERKEL CELL CARCINOMA OF UNSPECIFIED PART OF FACE: ICD-10-CM

## 2021-06-09 DIAGNOSIS — Z01.818 ENCOUNTER FOR OTHER PREPROCEDURAL EXAMINATION: ICD-10-CM

## 2021-06-09 DIAGNOSIS — M48.061 SPINAL STENOSIS, LUMBAR REGION WITHOUT NEUROGENIC CLAUDICATION: Chronic | ICD-10-CM

## 2021-06-09 DIAGNOSIS — Z98.890 OTHER SPECIFIED POSTPROCEDURAL STATES: Chronic | ICD-10-CM

## 2021-06-09 DIAGNOSIS — M19.90 UNSPECIFIED OSTEOARTHRITIS, UNSPECIFIED SITE: Chronic | ICD-10-CM

## 2021-06-09 PROCEDURE — 11642 EXC F/E/E/N/L MAL+MRG 1.1-2: CPT

## 2021-06-09 PROCEDURE — 88341 IMHCHEM/IMCYTCHM EA ADD ANTB: CPT | Mod: 26

## 2021-06-09 PROCEDURE — 88305 TISSUE EXAM BY PATHOLOGIST: CPT | Mod: 26

## 2021-06-09 PROCEDURE — 88304 TISSUE EXAM BY PATHOLOGIST: CPT | Mod: 26

## 2021-06-09 PROCEDURE — C1889: CPT

## 2021-06-09 PROCEDURE — 78195 LYMPH SYSTEM IMAGING: CPT | Mod: 26,MH

## 2021-06-09 PROCEDURE — 88305 TISSUE EXAM BY PATHOLOGIST: CPT

## 2021-06-09 PROCEDURE — 88342 IMHCHEM/IMCYTCHM 1ST ANTB: CPT

## 2021-06-09 PROCEDURE — 15260 FTH/GFT FR N/E/E/L 20 SQCM/<: CPT

## 2021-06-09 PROCEDURE — 30117 REMOVAL OF INTRANASAL LESION: CPT

## 2021-06-09 PROCEDURE — 15574 PEDCLE FH/CH/CH/M/N/AX/G/H/F: CPT

## 2021-06-09 PROCEDURE — 15760 COMPOSITE SKIN GRAFT: CPT

## 2021-06-09 PROCEDURE — 78195 LYMPH SYSTEM IMAGING: CPT

## 2021-06-09 PROCEDURE — 38510 BIOPSY/REMOVAL LYMPH NODES: CPT

## 2021-06-09 PROCEDURE — 38900 IO MAP OF SENT LYMPH NODE: CPT

## 2021-06-09 PROCEDURE — A9541: CPT

## 2021-06-09 PROCEDURE — 88304 TISSUE EXAM BY PATHOLOGIST: CPT

## 2021-06-09 PROCEDURE — 88342 IMHCHEM/IMCYTCHM 1ST ANTB: CPT | Mod: 26

## 2021-06-09 PROCEDURE — ZZZZZ: CPT

## 2021-06-09 PROCEDURE — 88341 IMHCHEM/IMCYTCHM EA ADD ANTB: CPT

## 2021-06-09 PROCEDURE — 21235 EAR CARTILAGE GRAFT: CPT

## 2021-06-09 PROCEDURE — 11102 TANGNTL BX SKIN SINGLE LES: CPT | Mod: 59

## 2021-06-09 RX ORDER — HEPARIN SODIUM 5000 [USP'U]/ML
5000 INJECTION INTRAVENOUS; SUBCUTANEOUS ONCE
Refills: 0 | Status: COMPLETED | OUTPATIENT
Start: 2021-06-09 | End: 2021-06-09

## 2021-06-09 RX ORDER — SODIUM CHLORIDE 9 MG/ML
1000 INJECTION, SOLUTION INTRAVENOUS
Refills: 0 | Status: DISCONTINUED | OUTPATIENT
Start: 2021-06-09 | End: 2021-06-09

## 2021-06-09 RX ORDER — ONDANSETRON 8 MG/1
4 TABLET, FILM COATED ORAL ONCE
Refills: 0 | Status: DISCONTINUED | OUTPATIENT
Start: 2021-06-09 | End: 2021-06-09

## 2021-06-09 RX ORDER — DIAZEPAM 5 MG
1 TABLET ORAL
Qty: 14 | Refills: 0
Start: 2021-06-09 | End: 2021-06-15

## 2021-06-09 RX ORDER — OXYCODONE HYDROCHLORIDE 5 MG/1
5 TABLET ORAL ONCE
Refills: 0 | Status: DISCONTINUED | OUTPATIENT
Start: 2021-06-09 | End: 2021-06-09

## 2021-06-09 RX ORDER — HYDROMORPHONE HYDROCHLORIDE 2 MG/ML
0.5 INJECTION INTRAMUSCULAR; INTRAVENOUS; SUBCUTANEOUS
Refills: 0 | Status: DISCONTINUED | OUTPATIENT
Start: 2021-06-09 | End: 2021-06-09

## 2021-06-09 RX ORDER — ONDANSETRON 8 MG/1
4 TABLET, FILM COATED ORAL ONCE
Refills: 0 | Status: DISCONTINUED | OUTPATIENT
Start: 2021-06-09 | End: 2021-06-23

## 2021-06-09 RX ADMIN — HEPARIN SODIUM 5000 UNIT(S): 5000 INJECTION INTRAVENOUS; SUBCUTANEOUS at 12:23

## 2021-06-09 NOTE — ASU PATIENT PROFILE, ADULT - PSH
H/O melanoma excision    H/O umbilical hernia repair  2012  Lumbar spinal stenosis    Osteoarthritis    S/P cataract extraction  2007, 2012  Status post Mohs surgery  face, 1990

## 2021-06-09 NOTE — ASU DISCHARGE PLAN (ADULT/PEDIATRIC) - BATHING
please keep face dry, do not get incisions wet until cleared. You may sponge wash your face./Sponge only

## 2021-06-09 NOTE — BRIEF OPERATIVE NOTE - OPERATION/FINDINGS
Excision of Merkel cell cancer from the left naris, with sentinel node biopsy and reconstruction
s/p left nasal ala wide excision of merkel cell. the wound had missing cartilage  we took a cartilage graft from left ear,  and used a nasolabial flap to reconstruction left nasal ala.  the left neck wound was closed w/ complex closure  shayan was used.  I was present for the entire length of the case, there was no other qualified resident to assist  I assisted with hemostasis, exposure, creating of flaps, closure of wound, and placement of dressings.

## 2021-06-09 NOTE — ASU PATIENT PROFILE, ADULT - PMH
COVID-19 vaccine series completed  moderna, completed 3/4/21  GERD (gastroesophageal reflux disease)    Hearing loss  right ear  Hyperlipidemia    Hypertension    Lipodermatosclerosis  diagnosed 2010  Melanoma  left calf  Merkel cell carcinoma  left nares  Obesity (BMI 30.0-34.9)    KEVEN (obstructive sleep apnea)  diagnosed > 10 years ago and does not use any treatment  Skin cancer  face, unsure type 1990

## 2021-06-09 NOTE — ASU DISCHARGE PLAN (ADULT/PEDIATRIC) - ASU DC SPECIAL INSTRUCTIONSFT
Initial followup with plastic surgery in the next 5-15 days, regarding matters of bandages, activities, and showering.    Dr. Mckeon should call with pathology report in approximately 2 weeks.  The conversation will determine further management. 1. Please follow up with Dr. Owen's PA, Cyndie, next week TUESDAY for your first post operative visit.   Please call [phone] for an appointment time.    2. Please avoid any strenuous activities, AVOID bending, stretching, reaching overhead, or any heavy lifting.    3. Some OOZING and blood on the dressing is normal and to be expected. If it becomes saturated w/ BRIGHT red blood that does not stop, please call [phone] for email CYNDIE: [email or web address].    NOSE - There will be some oozing and bleeding. THIS IS NORMAL. It will stop in 1-3 days.  Please avoid any bending down, pressure being applied to the nose or cheek area.  Please avoid blowing your nose, the stitches are within your nostrils.    NECK - This was where the lymph nodes were biopsied. Please use an ice pack over the dressing to decrease swelling. You can do it multiple times a day, for 15 minutes every hour as needed.    EAR - The left ear was where we took a cartilage graft. THIS AREA WILL BE SORE AND THIS IS NORMAL.  Please leave the dressings on do not fold your ear forward or wear a mask - strap on this ear. You can tape the mask strap on your head if you need to wear a mask.       4. Your medications were sent to your pharmacy. You MUST take the antibiotics as directed.  Please take the prescribed medications as directed.   For MILD pain please take regular over the counter pain medications such as: Advil, Tylenol, Motrin.   Only take the narcotic prescribed pain medication for SEVERE PAIN.   If constipation occurs after taking narcotic pain medications, please take an over the counter stool softener.    5. Dr. Mckeon will call with pathology results in approximately 2 weeks, the conversation will determine further management and follow up.

## 2021-06-09 NOTE — ASU PATIENT PROFILE, ADULT - VISION (WITH CORRECTIVE LENSES IF THE PATIENT USUALLY WEARS THEM):
Partially impaired: cannot see medication labels or newsprint, but can see obstacles in path, and the surrounding layout; can count fingers at arm's length no hearing right ear/Partially impaired: cannot see medication labels or newsprint, but can see obstacles in path, and the surrounding layout; can count fingers at arm's length

## 2021-06-15 ENCOUNTER — APPOINTMENT (OUTPATIENT)
Dept: PLASTIC SURGERY | Facility: CLINIC | Age: 81
End: 2021-06-15
Payer: MEDICARE

## 2021-06-15 DIAGNOSIS — C43.30 MALIGNANT MELANOMA OF UNSPECIFIED PART OF FACE: ICD-10-CM

## 2021-06-15 DIAGNOSIS — F41.9 ANXIETY DISORDER, UNSPECIFIED: ICD-10-CM

## 2021-06-15 PROCEDURE — 99024 POSTOP FOLLOW-UP VISIT: CPT

## 2021-06-16 LAB — SURGICAL PATHOLOGY STUDY: SIGNIFICANT CHANGE UP

## 2021-06-23 ENCOUNTER — APPOINTMENT (OUTPATIENT)
Dept: PLASTIC SURGERY | Facility: CLINIC | Age: 81
End: 2021-06-23

## 2021-06-23 NOTE — REVIEW OF SYSTEMS
[Negative] : Heme/Lymph [FreeTextEntry4] : Merkel cell cancer of the naris [FreeTextEntry5] : Valvular heart disease [FreeTextEntry6] : KEVEN [FreeTextEntry7] : Dermatosclerosis [FreeTextEntry8] : Heartburn [FreeTextEntry9] : Family history of kidney cancer [de-identified] : Osteoarthritis [de-identified] : History of melanoma and basal cell cancers

## 2021-06-23 NOTE — PHYSICAL EXAM
[Normal] : supple, no neck mass and thyroid not enlarged [Normal Neck Lymph Nodes] : normal neck lymph nodes  [Normal Supraclavicular Lymph Nodes] : normal supraclavicular lymph nodes [Normal Groin Lymph Nodes] : normal groin lymph nodes [Normal Axillary Lymph Nodes] : normal axillary lymph nodes [Normal] : full range of motion and no deformities appreciated [de-identified] : Below

## 2021-06-23 NOTE — REASON FOR VISIT
[Post Op: _________] : a [unfilled] post op visit [FreeTextEntry1] : s/p excisional biopsy of left nose merkel cell CA with nasolabial flap reconstruction DOS: 06/09/21.

## 2021-06-23 NOTE — REASON FOR VISIT
[Other: _____] : [unfilled] [FreeTextEntry2] : Newly diagnosed Merkel cell on the left naris, reestablish follow-up of stage I melanoma of the left calf

## 2021-06-23 NOTE — ASSESSMENT
[FreeTextEntry1] : We reviewed the recently diagnosed Merkel cell cancer of the left naris.\par \par 5/7/2021:\par PET scan at 450: No evidence of metastatic disease\par \par I explained the rationale and technique for appropriate excision, and sentinel node biopsy, coordinated with plastic surgery for reconstruction.\par \par They met with radiation oncology (Dr Jeffrey REN) who had laid out a similar plan\par \par Reviewed in detail, all questions answered.\par \par They understand and would like to proceed the operation as outlined above.\par Paperwork submitted\par \par Referring physician contacted through our secure internal email.\par \par \par 6/23/2021:\par I called her and we spoke.\par June 9, 2021, she had the following procedures:\par 1.  Excision of a Merkel cell cancer from the inner left naris.\par 2.  Biopsy of a new cutaneous lesion on the inner left nasal septum.\par 3.  Left cervical sentinel node biopsy.\par \par Reconstructions were by Dr. Dustin Owen.\par \par Final pathology is:\par 1.  No residual Merkel cell, + residual granulation tissue, at the primary site.\par 2.  Nasal septum lesion is a benign epidermal inclusion cyst.\par 3.  Prospect node biopsies are negative on histology and immunohistochemistry.\par \par Presently, no further intervention is warranted.\par I have contacted our nurses to arrange for a follow-up visit with radiation oncology to discuss adjuvant treatment.\par Note dictated to her referring physicians.\par My office will call to schedule a follow-up visit.

## 2021-06-23 NOTE — PHYSICAL EXAM
[NI] : Normal [de-identified] : L post auricular donor site healing well\par L cervical lymph node incision site healing well, steri strips in place

## 2021-06-23 NOTE — HISTORY OF PRESENT ILLNESS
[de-identified] : 80-year-old lady referred by medical oncology (Dr. 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 Brooke STONE) it did not appear particularly worrisome.\par April 2020 it was biopsied because of persistence\par \par \par I have seen her in the past.\par Last visit was October 2017.\par \par + Prior personal history:\par 1.  0.6 mm (T1 a) melanoma of the LEFT CALF, excised with negative margins.\par Reconstruction: Dr. Harris LEUNG.\par 2.  ~1985: Basal cell carcinoma of her LEFT CHEEK.\par \par No other personal history of skin cancer.\par \par No other personal history of malignancy.\par \par No relatives with skin cancer.\par \par Family history of malignancy:\par Mother had renal cell cancer, AND lung cancer.\par \par \par Derm:\par Dr. Brooke STONE.\par \par \par PMD: Dr. Maycol MCMILLAN.\par \par No pacemaker or defibrillator.\par No anticoagulants.\par \par + KEVEN (obstructive sleep apnea).\par Pulmonary: Dr. Casi GREENBERG.\par She does not use a CPAP machine.\par She does not use any inhalers\par \par + Hypertension, treated with metoprolol.\par + Hypercholesterolemia, treated with atorvastatin.\par + Mild mitral and aortic insufficiency.\par + Mild tricuspid regurgitation.\par + Mild pulmonary hypertension.\par Has seen cardiology in the past (May 2018): Dr. Clyde VALDEZ.\par \par + Dermatosclerosis.\par She does not see anyone for follow-up of this problem\par \par + Osteoarthritis.\par Rheumatology: Dr. Estela LEAL.\par \par She has symptoms of reflux.\par No upper endoscopy.\par Symptoms are treated with omeprazole.\par \par \par Eye examinations:\par Dr. Baljit HERNANDEZ.\par April 2021 visit was unremarkable\par \par \par GYN:\par Dr. Cristine NICOLE.\par April 2021 visit was unremarkable\par \par \par Breast imaging.\par [facility].\par April 2021 imaging was unremarkable.\par \par \par Colonoscopy at age 72 was unremarkable.

## 2021-06-30 ENCOUNTER — OUTPATIENT (OUTPATIENT)
Dept: OUTPATIENT SERVICES | Facility: HOSPITAL | Age: 81
LOS: 1 days | End: 2021-06-30
Payer: MEDICARE

## 2021-06-30 VITALS
OXYGEN SATURATION: 96 % | DIASTOLIC BLOOD PRESSURE: 83 MMHG | WEIGHT: 181.66 LBS | HEART RATE: 58 BPM | TEMPERATURE: 99 F | HEIGHT: 63.5 IN | SYSTOLIC BLOOD PRESSURE: 156 MMHG | RESPIRATION RATE: 16 BRPM

## 2021-06-30 DIAGNOSIS — M48.061 SPINAL STENOSIS, LUMBAR REGION WITHOUT NEUROGENIC CLAUDICATION: Chronic | ICD-10-CM

## 2021-06-30 DIAGNOSIS — G47.33 OBSTRUCTIVE SLEEP APNEA (ADULT) (PEDIATRIC): ICD-10-CM

## 2021-06-30 DIAGNOSIS — I10 ESSENTIAL (PRIMARY) HYPERTENSION: ICD-10-CM

## 2021-06-30 DIAGNOSIS — Z98.890 OTHER SPECIFIED POSTPROCEDURAL STATES: Chronic | ICD-10-CM

## 2021-06-30 DIAGNOSIS — C4A.9 MERKEL CELL CARCINOMA, UNSPECIFIED: ICD-10-CM

## 2021-06-30 DIAGNOSIS — M19.90 UNSPECIFIED OSTEOARTHRITIS, UNSPECIFIED SITE: Chronic | ICD-10-CM

## 2021-06-30 DIAGNOSIS — C4A.30 MERKEL CELL CARCINOMA OF UNSPECIFIED PART OF FACE: ICD-10-CM

## 2021-06-30 DIAGNOSIS — K21.9 GASTRO-ESOPHAGEAL REFLUX DISEASE WITHOUT ESOPHAGITIS: ICD-10-CM

## 2021-06-30 DIAGNOSIS — E78.5 HYPERLIPIDEMIA, UNSPECIFIED: ICD-10-CM

## 2021-06-30 DIAGNOSIS — Z01.818 ENCOUNTER FOR OTHER PREPROCEDURAL EXAMINATION: ICD-10-CM

## 2021-06-30 PROCEDURE — G0463: CPT

## 2021-06-30 NOTE — H&P PST ADULT - HISTORY OF PRESENT ILLNESS
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 Denies pain or drainage from the area.  Patient underwent surgery on nose with skin graft 5/24/2021 and now returns for nasal reconstruction for 7/7/2021.

## 2021-06-30 NOTE — H&P PST ADULT - NSICDXPASTSURGICALHX_GEN_ALL_CORE_FT
PAST SURGICAL HISTORY:  H/O melanoma excision 5/2021    H/O umbilical hernia repair 2012    Osteoarthritis     S/P cataract extraction 2007, 2012    Status post Mohs surgery face, 1990

## 2021-06-30 NOTE — H&P PST ADULT - NSICDXPROBLEM_GEN_ALL_CORE_FT
PROBLEM DIAGNOSES  Problem: Merkel cell carcinoma  Assessment and Plan: Pre-operative instructions provided and patient verbalized instructions.  Patient advised to stop NSAIDs, aspirin, herbal supplements and multivitamins.  She will take regular prescription medications with sips of water but avoid eating on the day of surgery.  Pending medical clearance as requested by surgeon.      Problem: KEVEN (obstructive sleep apnea)  Assessment and Plan: no CPAP    Problem: Hypertension  Assessment and Plan: continue meds    Problem: Hyperlipidemia  Assessment and Plan: continue meds    Problem: GERD (gastroesophageal reflux disease)  Assessment and Plan: continue meds

## 2021-06-30 NOTE — H&P PST ADULT - GENERAL
PROCEDURE:Jose placement at bedside. INDICATION:dialysis. ANESTHESIA:  COMPLICATION:NONE. SPECIMENS REMOVED:none. BLOOD LOSS:NONE. /ASSISTANT:JEET Padilla RECOMMENDATIONS:may use. CONSENT OBTAINED:YES.  NOTES:none. details…

## 2021-07-01 ENCOUNTER — APPOINTMENT (OUTPATIENT)
Dept: INTERNAL MEDICINE | Facility: CLINIC | Age: 81
End: 2021-07-01
Payer: MEDICARE

## 2021-07-01 VITALS
DIASTOLIC BLOOD PRESSURE: 82 MMHG | BODY MASS INDEX: 32.85 KG/M2 | RESPIRATION RATE: 16 BRPM | OXYGEN SATURATION: 97 % | HEART RATE: 71 BPM | HEIGHT: 63 IN | TEMPERATURE: 97.7 F | WEIGHT: 185.4 LBS | SYSTOLIC BLOOD PRESSURE: 160 MMHG

## 2021-07-01 VITALS — SYSTOLIC BLOOD PRESSURE: 162 MMHG | DIASTOLIC BLOOD PRESSURE: 90 MMHG

## 2021-07-01 DIAGNOSIS — Z01.818 ENCOUNTER FOR OTHER PREPROCEDURAL EXAMINATION: ICD-10-CM

## 2021-07-01 PROCEDURE — 99214 OFFICE O/P EST MOD 30 MIN: CPT

## 2021-07-01 NOTE — PLAN
[FreeTextEntry1] : 1. EKG performed on 6/3/21 - results are noted above.\par 2. The patient was instructed to stop eating prior to midnight the evening before the procedure.\par 3. The patient was advised to stop Multivitamin and Omega-3 Fish Oil today. She was advised to stop all other vitamins/supplements today. She was advised to take Metoprolol Tartrate and Omeprazole on the morning of the procedure with sips of water if normally taken at that time. She was advised to stop all other medications 1 day prior to the scheduled date of the procedure.\par 4. There is no medical contraindication to the planned procedure and the patient is therefore medically optimized/cleared for the procedure pending results of COVID-19 PCR (Nasopharyngeal Swab) if necessary; recommended patient's pulmonary function be closely monitored as KEVEN is not currently being treated; BP acceptable for surgery: elevated BP readings today may be partially secondary to anxiety regarding her recent diagnosis of Merkel cell carcinoma, excision, and upcoming reconstruction; increase Metoprolol Tartrate dosage from 25mg to 37.5mg p.o.q.d. as directed.

## 2021-07-01 NOTE — HISTORY OF PRESENT ILLNESS
[No Pertinent Cardiac History] : no history of aortic stenosis, atrial fibrillation, coronary artery disease, recent myocardial infarction, or implantable device/pacemaker [Sleep Apnea] : sleep apnea [No Adverse Anesthesia Reaction] : no adverse anesthesia reaction in self or family member [(Patient denies any chest pain, claudication, dyspnea on exertion, orthopnea, palpitations or syncope)] : Patient denies any chest pain, claudication, dyspnea on exertion, orthopnea, palpitations or syncope [Moderate (4-6 METs)] : Moderate (4-6 METs) [Anticoagulants: _____] : Anticoagulants: [unfilled] [Asthma] : no asthma [COPD] : no COPD [Smoker] : not a smoker [Chronic Anticoagulation] : no chronic anticoagulation [Chronic Kidney Disease] : no chronic kidney disease [Diabetes] : no diabetes [FreeTextEntry1] : reconstructive plastic surgery (s/p excision of Merkel cell carcinoma) [FreeTextEntry2] : 7/7/21 [FreeTextEntry3] : Dr. Dustin Owen [FreeTextEntry4] : Patient is a 80 year-old female with a past medical history as below who presents for preoperative evaluation prior to reconstructive plastic surgery, s/p excision of Merkel cell carcinoma. The excision was performed by Dr. Quinten Mckeon at [facility]. Reconstruction will be performed by Dr. Dustin Owen at [facility]. The patient has no history of allergy or adverse reaction to anesthesia. The patient can walk 2 blocks and can walk up 1-2 flights of stairs without chest pain or palpitations. She notes history of SOB on exertion. KEVEN is not currently being treated.

## 2021-07-01 NOTE — ADDENDUM
[FreeTextEntry1] : I, Maxx Cevallos, acted solely as scribe for Dr. Maycol Saldana DO on this date 07/01/2021 11:30AM .\par \par All medical record entries made by the Scribe were at my, Dr. Maycol Saldana DO direction and personally dictated by me on 07/01/2021 11:30AM. I have reviewed the chart and agree that the record accurately reflects my personal performance of the history, physical exam, assessment and plan. I have also personally directed, reviewed and agreed with the chart.\par

## 2021-07-01 NOTE — PHYSICAL EXAM
[No Acute Distress] : no acute distress [Well Nourished] : well nourished [Well Developed] : well developed [Well-Appearing] : well-appearing [Normal Sclera/Conjunctiva] : normal sclera/conjunctiva [PERRL] : pupils equal round and reactive to light [EOMI] : extraocular movements intact [Normal Outer Ear/Nose] : the outer ears and nose were normal in appearance [Normal Oropharynx] : the oropharynx was normal [No JVD] : no jugular venous distention [No Lymphadenopathy] : no lymphadenopathy [Supple] : supple [Thyroid Normal, No Nodules] : the thyroid was normal and there were no nodules present [No Respiratory Distress] : no respiratory distress  [No Accessory Muscle Use] : no accessory muscle use [Clear to Auscultation] : lungs were clear to auscultation bilaterally [Normal Rate] : normal rate  [Regular Rhythm] : with a regular rhythm [Normal S1, S2] : normal S1 and S2 [No Murmur] : no murmur heard [No Carotid Bruits] : no carotid bruits [No Abdominal Bruit] : a ~M bruit was not heard ~T in the abdomen [No Varicosities] : no varicosities [Pedal Pulses Present] : the pedal pulses are present [No Edema] : there was no peripheral edema [No Palpable Aorta] : no palpable aorta [No Extremity Clubbing/Cyanosis] : no extremity clubbing/cyanosis [Soft] : abdomen soft [Non Tender] : non-tender [Non-distended] : non-distended [No Masses] : no abdominal mass palpated [No HSM] : no HSM [Normal Bowel Sounds] : normal bowel sounds [Normal Posterior Cervical Nodes] : no posterior cervical lymphadenopathy [Normal Anterior Cervical Nodes] : no anterior cervical lymphadenopathy [No CVA Tenderness] : no CVA  tenderness [No Spinal Tenderness] : no spinal tenderness [No Joint Swelling] : no joint swelling [Grossly Normal Strength/Tone] : grossly normal strength/tone [No Rash] : no rash [Coordination Grossly Intact] : coordination grossly intact [No Focal Deficits] : no focal deficits [Normal Gait] : normal gait [Deep Tendon Reflexes (DTR)] : deep tendon reflexes were 2+ and symmetric [Normal Affect] : the affect was normal [Normal Insight/Judgement] : insight and judgment were intact [de-identified] : obese

## 2021-07-01 NOTE — ASSESSMENT
[Patient Optimized for Surgery] : Patient optimized for surgery [No Further Testing Recommended] : no further testing recommended [Modify anticoagulant treatment prior to procedure] : Modify anticoagulant treatment prior to procedure [Modify medications prior to procedure] : Modify medications prior to procedure [FreeTextEntry4] : The patient is a 80 year-old female who is a low risk surgical patient with adequate functional capacity going for an intermediate risk surgical procedure.  [FreeTextEntry7] : Advised to take Metoprolol Tartrate and Omeprazole on the morning of the procedure with sips of water if normally taken at that time; advised to stop all other medications 1 day prior to the scheduled date of the procedure; advised to stop all vitamins/supplements today. [FreeTextEntry5] : Stop Multivitamin and Omega-3 Fish Oil today.

## 2021-07-06 ENCOUNTER — TRANSCRIPTION ENCOUNTER (OUTPATIENT)
Age: 81
End: 2021-07-06

## 2021-07-06 ENCOUNTER — RX RENEWAL (OUTPATIENT)
Age: 81
End: 2021-07-06

## 2021-07-07 ENCOUNTER — OUTPATIENT (OUTPATIENT)
Dept: OUTPATIENT SERVICES | Facility: HOSPITAL | Age: 81
LOS: 1 days | End: 2021-07-07
Payer: MEDICARE

## 2021-07-07 ENCOUNTER — APPOINTMENT (OUTPATIENT)
Dept: PLASTIC SURGERY | Facility: HOSPITAL | Age: 81
End: 2021-07-07
Payer: MEDICARE

## 2021-07-07 VITALS
HEIGHT: 63.5 IN | RESPIRATION RATE: 18 BRPM | WEIGHT: 181.66 LBS | OXYGEN SATURATION: 97 % | TEMPERATURE: 99 F | HEART RATE: 56 BPM | DIASTOLIC BLOOD PRESSURE: 82 MMHG | SYSTOLIC BLOOD PRESSURE: 161 MMHG

## 2021-07-07 VITALS
OXYGEN SATURATION: 95 % | DIASTOLIC BLOOD PRESSURE: 75 MMHG | RESPIRATION RATE: 16 BRPM | HEART RATE: 62 BPM | TEMPERATURE: 97 F | SYSTOLIC BLOOD PRESSURE: 126 MMHG

## 2021-07-07 DIAGNOSIS — Z98.890 OTHER SPECIFIED POSTPROCEDURAL STATES: Chronic | ICD-10-CM

## 2021-07-07 DIAGNOSIS — C4A.30 MERKEL CELL CARCINOMA OF UNSPECIFIED PART OF FACE: ICD-10-CM

## 2021-07-07 DIAGNOSIS — M19.90 UNSPECIFIED OSTEOARTHRITIS, UNSPECIFIED SITE: Chronic | ICD-10-CM

## 2021-07-07 PROCEDURE — 15576 PEDICLE E/N/E/L/NTRORAL: CPT

## 2021-07-07 PROCEDURE — 15630 DELAY FLAP EYE/NOS/EAR/LIP: CPT | Mod: 58

## 2021-07-07 RX ORDER — SODIUM CHLORIDE 9 MG/ML
1000 INJECTION, SOLUTION INTRAVENOUS
Refills: 0 | Status: DISCONTINUED | OUTPATIENT
Start: 2021-07-07 | End: 2021-07-07

## 2021-07-07 RX ORDER — HYDROMORPHONE HYDROCHLORIDE 2 MG/ML
0.25 INJECTION INTRAMUSCULAR; INTRAVENOUS; SUBCUTANEOUS ONCE
Refills: 0 | Status: DISCONTINUED | OUTPATIENT
Start: 2021-07-07 | End: 2021-07-07

## 2021-07-07 RX ORDER — ATORVASTATIN CALCIUM 80 MG/1
1 TABLET, FILM COATED ORAL
Qty: 0 | Refills: 0 | DISCHARGE

## 2021-07-07 RX ORDER — METOPROLOL TARTRATE 50 MG
1 TABLET ORAL
Qty: 0 | Refills: 0 | DISCHARGE

## 2021-07-07 RX ORDER — ONDANSETRON 8 MG/1
4 TABLET, FILM COATED ORAL ONCE
Refills: 0 | Status: COMPLETED | OUTPATIENT
Start: 2021-07-07 | End: 2021-07-07

## 2021-07-07 RX ORDER — OMEGA-3 ACID ETHYL ESTERS 1 G
1 CAPSULE ORAL
Qty: 0 | Refills: 0 | DISCHARGE

## 2021-07-07 RX ORDER — CHOLECALCIFEROL (VITAMIN D3) 125 MCG
1 CAPSULE ORAL
Qty: 0 | Refills: 0 | DISCHARGE

## 2021-07-07 RX ORDER — UBIDECARENONE 100 MG
1 CAPSULE ORAL
Qty: 0 | Refills: 0 | DISCHARGE

## 2021-07-07 RX ORDER — OMEPRAZOLE 10 MG/1
1 CAPSULE, DELAYED RELEASE ORAL
Qty: 0 | Refills: 0 | DISCHARGE

## 2021-07-07 RX ORDER — CEPHALEXIN 500 MG
1 CAPSULE ORAL
Qty: 21 | Refills: 0
Start: 2021-07-07 | End: 2021-07-13

## 2021-07-07 RX ADMIN — SODIUM CHLORIDE 50 MILLILITER(S): 9 INJECTION, SOLUTION INTRAVENOUS at 10:50

## 2021-07-07 RX ADMIN — ONDANSETRON 4 MILLIGRAM(S): 8 TABLET, FILM COATED ORAL at 13:32

## 2021-07-07 NOTE — ASU DISCHARGE PLAN (ADULT/PEDIATRIC) - NURSING INSTRUCTIONS
Low vitamin-D:  8 6-patient should continue taking 5000 international units on daily basis    low B12:  276 in 03/22/2019-patient should continue taking vitamin B12 500 mcg daily    Migraine headaches  Preventive therapy for headaches:   - none needed at this time  Abortive therapy for headaches:   - at the onset of her migraine headache patients typically taking Tylenol on that does seem to help  I told patient that if that fails we could try indomethacin 50 mg with food  Work up:   - MRI cervical spine    Headache management instructions  - When patient has a moderate to severe headache, they should seek rest, initiate relaxation and apply cold compresses to the head  - Maintain regular sleep schedule  Adults need at least 7-8 hours of uninterrupted a night  - Limit over the counter medications such as Tylenol, Ibuprofen, Aleve, Excedrin  (No more than 2- 3 times a week or max 10 a month)  - Maintain headache diary  Free MEG for a smart phone, which can be used is "Migraine maria isabel"  - Limit caffeine to 1-2 cups 8 to 16 oz a day or less  - Avoid dietary trigger  (aged cheese, peanuts, MSG, aspartame and nitrates)  - Patient is to have regular frequent meals to prevent headache onset  - Please drink at least 64 ounces of water a day to help remain hydrated  Neck pain:   Preventive therapy: Will start patient on tizanidine 2 mg at bedtime daily  - Neck pathology and poor posture, with straightening of the normal cervical lordosis, can cause headaches  Tightening of the neck muscles can irritate the nerves in the occipital region of the head and cause or worsen head pain  Thus neck strengthening and relaxation exercises, can help improve this particular pain  It is importance to have good posture for improving shoulder, neck, and head pain  - Here are some exercises which should take 5 minutes:     1  Standing, drop your head to one side while continuing to look ahead   Hold for 10 seconds then swap sides  Repeat twice more each side  To increase the stretch, drop the opposite shoulder  2  Standing again, lower your chin to your chest, hold for 10 and then look up to the ceiling and hold for 10  Repeat twice more  3  Next, standing straight again, look over your right shoulder and hold firm for 10 seconds, then over your left shoulder for 10  Repeat this 3 times  4  Finally, while sitting upright, bring your head forward and hold for 10, then all the way back and hold for 10  If this simple exercise does not help improve the posture, we will consider formal physical therapy in the future  Importance of Healthy Sleep:  Behavioral sleep changes can promote restful, regular sleep and reduce headache  Simple changes like establishing consistent sleep and wake-up times, as well as getting between 7 and 8 hours of sleep a day, can make a world of difference  Experts also recommend avoiding substances that impair sleep, like caffeine, nicotine and alcohol, and also suggest winding down before bed to prevent sleep problems  To read more go to https://americanmigrainefoundation  org/resource-library/sleep/    Exercising for migraineurs:  Regular exercise can reduce the frequency and intensity of headaches and migraines  When one exercises, the body releases endorphins, which are the bodys natural painkillers  Exercise reduces stress and helps individuals to sleep at night  Exercising at least 30 to 40 minutes 3 times a week is sufficient for most patients  When exercising, follow this plan to prevent headaches:  - First, stay hydrated before, during, and after exercise  - Second part of the exercise plan is to eat sufficient food about an hour and a half before you exercise  Exercise causes ones blood sugar level to decrease, and it is important to have a source of energy    - Final part of the exercise plan is to warm-up   Do not jump into sudden, vigorous exercise if that triggers a headache or migraine  To read more go to https://americanmigrainefoundation  org/resource-library/effects-of-exercise-on-headaches-and-migraines/     Please call with any questions or concerns   Office number is [phone] All discharge.safety,follow up care to surgeon reviewed. Eat lightly avoid heavy and or spicy foods. Take pain medication with a food item and not on an empty stomach. Reviewed all instructions noted above. Verbalizes understanding of information provided.

## 2021-07-07 NOTE — BRIEF OPERATIVE NOTE - OPERATION/FINDINGS
I was present for the entire length of the case, there was no other qualified resident to assist  I assisted with hemostasis, exposure, creating of flaps, closure of wound, and placement of dressings. 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  reconstructed w/ nasolabial flap. today underwent take down of NL flap     I was present for the entire length of the case, there was no other qualified resident to assist  I assisted with hemostasis, exposure, creating of flaps, closure of wound, and placement of dressings.

## 2021-07-07 NOTE — ASU DISCHARGE PLAN (ADULT/PEDIATRIC) - CARE PROVIDER_API CALL
Dustin Owen (MD)  ColonRectal Surgery; Plastic Surgery; Surgery; Surgery of the Hand  [address], [address]  [place] [address]  Phone: [phone]  Fax: [phone]  Follow Up Time:

## 2021-07-07 NOTE — ASU PATIENT PROFILE, ADULT - PSH
H/O melanoma excision  5/2021  H/O umbilical hernia repair  2012  Osteoarthritis    S/P cataract extraction  2007, 2012  Status post Mohs surgery  face, 1990

## 2021-07-07 NOTE — ASU DISCHARGE PLAN (ADULT/PEDIATRIC) - ASU DC SPECIAL INSTRUCTIONSFT
1. Please follow up with Dr. Owen's PA, Cyndie, next week TUESDAY for your first post operative visit.   Please call [phone] for an appointment time.    2. Please avoid any strenuous activities, AVOID bending, stretching, reaching overhead, or any heavy lifting. Please avoid bending down to lift objects. Sleep with 1-2 pillows - with your head elevated.    3. Some OOZING and blood on the dressing is normal and to be expected. If it becomes saturated w/ BRIGHT red blood that does not stop, please call [phone] for email CYNDIE: [email or web address]    4. Your medications were sent to your pharmacy. PLEASE take all antibiotics as directed.  Please take the prescribed medications as directed.   For MILD pain please take regular over the counter pain medications such as: Advil, Tylenol, Motrin.

## 2021-07-07 NOTE — ASU DISCHARGE PLAN (ADULT/PEDIATRIC) - CALL YOUR DOCTOR IF YOU HAVE ANY OF THE FOLLOWING:
[phone] or call 911./Bleeding that does not stop/Pain not relieved by Medications/Fever greater than (need to indicate Fahrenheit or Celsius)/Wound/Surgical Site with redness, or foul smelling discharge or pus/Nausea and vomiting that does not stop [phone] or call 911./Bleeding that does not stop/Pain not relieved by Medications/Fever greater than (need to indicate Fahrenheit or Celsius)/Wound/Surgical Site with redness, or foul smelling discharge or pus/Nausea and vomiting that does not stop/Inability to tolerate liquids or foods

## 2021-07-07 NOTE — ASU PATIENT PROFILE, ADULT - ABILITY TO HEAR (WITH HEARING AID OR HEARING APPLIANCE IF NORMALLY USED):
Adequate: hears normal conversation without difficulty deaf right ear/Mildly to Moderately Impaired: difficulty hearing in some environments or speaker may need to increase volume or speak distinctly

## 2021-07-15 ENCOUNTER — APPOINTMENT (OUTPATIENT)
Dept: PLASTIC SURGERY | Facility: CLINIC | Age: 81
End: 2021-07-15
Payer: MEDICARE

## 2021-07-15 PROCEDURE — 99024 POSTOP FOLLOW-UP VISIT: CPT

## 2021-08-05 ENCOUNTER — APPOINTMENT (OUTPATIENT)
Dept: PLASTIC SURGERY | Facility: CLINIC | Age: 81
End: 2021-08-05
Payer: MEDICARE

## 2021-08-05 VITALS
HEIGHT: 65 IN | OXYGEN SATURATION: 95 % | DIASTOLIC BLOOD PRESSURE: 77 MMHG | WEIGHT: 180 LBS | BODY MASS INDEX: 29.99 KG/M2 | HEART RATE: 58 BPM | SYSTOLIC BLOOD PRESSURE: 145 MMHG | TEMPERATURE: 97.6 F

## 2021-08-05 PROCEDURE — 99024 POSTOP FOLLOW-UP VISIT: CPT

## 2021-09-14 ENCOUNTER — APPOINTMENT (OUTPATIENT)
Dept: PLASTIC SURGERY | Facility: CLINIC | Age: 81
End: 2021-09-14

## 2021-09-14 NOTE — REASON FOR VISIT
[Follow-Up: _____] : a [unfilled] follow-up visit [FreeTextEntry1] : s/p excisional biopsy of left nose merkel cell CA with nasolabial flap reconstruction DOS: 07/07/21.

## 2021-09-29 ENCOUNTER — APPOINTMENT (OUTPATIENT)
Dept: RADIATION ONCOLOGY | Facility: CLINIC | Age: 81
End: 2021-09-29
Payer: MEDICARE

## 2021-09-29 VITALS
HEART RATE: 58 BPM | TEMPERATURE: 98.06 F | BODY MASS INDEX: 30.79 KG/M2 | RESPIRATION RATE: 16 BRPM | OXYGEN SATURATION: 99 % | DIASTOLIC BLOOD PRESSURE: 76 MMHG | SYSTOLIC BLOOD PRESSURE: 159 MMHG | WEIGHT: 184.99 LBS

## 2021-09-29 PROCEDURE — 99213 OFFICE O/P EST LOW 20 MIN: CPT | Mod: GC

## 2021-09-29 RX ORDER — ALPRAZOLAM 0.25 MG/1
0.25 TABLET ORAL
Qty: 30 | Refills: 0 | Status: DISCONTINUED | COMMUNITY
Start: 2021-06-15 | End: 2021-09-29

## 2021-10-01 NOTE — HISTORY OF PRESENT ILLNESS
[FreeTextEntry1] : Mrs. Randolph is a 80 year old female with Merkel cell carcinoma presenting for discussion of radiation therapy. \par Patient noticed a small pimple on the border of her left nares since at least October 2020.  Later, she sought attention for this.\par She underwent biopsy of the left inner nostril with her dermatologist (Dr. Brooke Wu) on 4/21/2021 which demonstrated Merkel cell ca.\par She has a history of prior Melanoma on 10/21/2016 underwent wide excision of an uncomplicated 0.6 mm melanoma from the left calf, with negative margins with Dr. Quinten Mckeon and reconstruction by Dr. Nichole This was an asymptomatic pigmented lesion discovered on routine skin and evaluation.\par \par Today presents in consultation. Reports feeling well. \par

## 2021-10-01 NOTE — REVIEW OF SYSTEMS
[Lower Ext Edema] : lower extremity edema [Joint Pain] : joint pain [Muscle Pain] : muscle pain [Negative] : Allergic/Immunologic [Chest Pain] : no chest pain [Palpitations] : no palpitations [Leg Claudication] : no intermittent leg claudication [Muscle Weakness] : no muscle weakness [Gait Disturbance] : no gait disturbance

## 2021-10-21 ENCOUNTER — NON-APPOINTMENT (OUTPATIENT)
Age: 81
End: 2021-10-21

## 2021-10-26 ENCOUNTER — APPOINTMENT (OUTPATIENT)
Dept: CARDIOLOGY | Facility: CLINIC | Age: 81
End: 2021-10-26
Payer: MEDICARE

## 2021-10-26 ENCOUNTER — NON-APPOINTMENT (OUTPATIENT)
Age: 81
End: 2021-10-26

## 2021-10-26 VITALS
WEIGHT: 180 LBS | BODY MASS INDEX: 29.99 KG/M2 | HEART RATE: 56 BPM | DIASTOLIC BLOOD PRESSURE: 80 MMHG | SYSTOLIC BLOOD PRESSURE: 150 MMHG | OXYGEN SATURATION: 97 % | HEIGHT: 65 IN

## 2021-10-26 PROCEDURE — 99204 OFFICE O/P NEW MOD 45 MIN: CPT

## 2021-10-26 PROCEDURE — 93000 ELECTROCARDIOGRAM COMPLETE: CPT

## 2021-10-26 NOTE — CARDIOLOGY SUMMARY
[de-identified] : 10/26/21.  Sinus  Bradycardia  [de-identified] : 9/11/2015.  No ischemic ST changes with administration of Regadenoson.  Probably normal myocardial perfusion SPECT scan (breast attenuation artifact).  LVEF greater than 70% [de-identified] : 4/18/2017. Normal left ventricular systolic function with estimated ejection fraction 60%. Mild concentric LVH. Mild diastolic dysfunction. Normal right ventricular size and function. Mild aortic root dilation (3.8 cm).  Mild tricuspid regurgitation.

## 2021-10-26 NOTE — PHYSICAL EXAM
[Normal S1, S2] : normal S1, S2 [Clear Lung Fields] : clear lung fields [Soft] : abdomen soft [Non Tender] : non-tender [No Rash] : no rash [Moves all extremities] : moves all extremities [Alert and Oriented] : alert and oriented [de-identified] : Overweight [de-identified] : Pupils are round [de-identified] : Wearing an N95 facemask [de-identified] : No JVD [de-identified] : Walks with cane [de-identified] : Mild bilateral leg/foot edema

## 2021-10-26 NOTE — HISTORY OF PRESENT ILLNESS
[FreeTextEntry1] : Rebecca Randolph is a 80-year-old woman who presents for a cardiology consultation - she had previously been a patient of Dr. Kaur (now retired - last visit 5/2018).  She has a history of hypertension, hypercholesterolemia, obesity, GERD, sleep apnea (could not tolerate CPAP), asthma, pulmonary hypertension, melanoma, Merkel cell carcinoma (diagnosed in April 2021).  She presents for evaluation of exertional dyspnea.   She describes shortness of breath when climbing stairs (but not really with other activities); no associated chest discomfort; dyspnea improves after a short rest. She has no past history of ischemic heart disease,

## 2021-10-26 NOTE — DISCUSSION/SUMMARY
[With Me] : with me [___ Month(s)] : in [unfilled] month(s) [FreeTextEntry1] : \par Dyspnea on exertion: Mrs Randolph describes exertional dyspnea when climbing stairs (unaccompanied by chest discomfort); I recommend further evaluation with echocardiography and a pharmacologic nuclear stress test (unable to exercise on a treadmill).\par \par Hypertension: Suboptimal control -- multiple measurements recorded in EMR that are moderately elevated; continue metoprolol and add valsartan 80 mg once daily.\par \par Hypercholesterolemia: Fair control; continue atorvastatin 40 mg daily.

## 2021-10-26 NOTE — REVIEW OF SYSTEMS
[Dyspnea on exertion] : dyspnea during exertion [Chest Discomfort] : no chest discomfort [Lower Ext Edema] : lower extremity edema [Palpitations] : no palpitations [Negative] : Heme/Lymph

## 2021-10-28 ENCOUNTER — APPOINTMENT (OUTPATIENT)
Dept: PLASTIC SURGERY | Facility: CLINIC | Age: 81
End: 2021-10-28
Payer: MEDICARE

## 2021-10-28 VITALS
SYSTOLIC BLOOD PRESSURE: 136 MMHG | TEMPERATURE: 208.58 F | WEIGHT: 180 LBS | BODY MASS INDEX: 30.73 KG/M2 | HEART RATE: 51 BPM | DIASTOLIC BLOOD PRESSURE: 82 MMHG | HEIGHT: 64.17 IN | OXYGEN SATURATION: 93 %

## 2021-10-28 PROCEDURE — 99213 OFFICE O/P EST LOW 20 MIN: CPT

## 2021-11-10 ENCOUNTER — APPOINTMENT (OUTPATIENT)
Dept: CARDIOLOGY | Facility: CLINIC | Age: 81
End: 2021-11-10

## 2021-11-11 ENCOUNTER — APPOINTMENT (OUTPATIENT)
Dept: CARDIOLOGY | Facility: CLINIC | Age: 81
End: 2021-11-11
Payer: MEDICARE

## 2021-11-11 PROCEDURE — 93306 TTE W/DOPPLER COMPLETE: CPT

## 2021-12-04 ENCOUNTER — RX RENEWAL (OUTPATIENT)
Age: 81
End: 2021-12-04

## 2021-12-14 ENCOUNTER — NON-APPOINTMENT (OUTPATIENT)
Age: 81
End: 2021-12-14

## 2021-12-16 ENCOUNTER — APPOINTMENT (OUTPATIENT)
Dept: CARDIOLOGY | Facility: CLINIC | Age: 81
End: 2021-12-16
Payer: MEDICARE

## 2021-12-16 PROCEDURE — A9500: CPT

## 2021-12-16 PROCEDURE — 78452 HT MUSCLE IMAGE SPECT MULT: CPT

## 2021-12-16 PROCEDURE — 93015 CV STRESS TEST SUPVJ I&R: CPT

## 2022-01-07 ENCOUNTER — RX RENEWAL (OUTPATIENT)
Age: 82
End: 2022-01-07

## 2022-01-22 ENCOUNTER — RX RENEWAL (OUTPATIENT)
Age: 82
End: 2022-01-22

## 2022-01-25 ENCOUNTER — APPOINTMENT (OUTPATIENT)
Dept: CARDIOLOGY | Facility: CLINIC | Age: 82
End: 2022-01-25

## 2022-01-27 ENCOUNTER — APPOINTMENT (OUTPATIENT)
Dept: PLASTIC SURGERY | Facility: CLINIC | Age: 82
End: 2022-01-27

## 2022-02-28 ENCOUNTER — RX RENEWAL (OUTPATIENT)
Age: 82
End: 2022-02-28

## 2022-03-22 ENCOUNTER — NON-APPOINTMENT (OUTPATIENT)
Age: 82
End: 2022-03-22

## 2022-03-22 ENCOUNTER — APPOINTMENT (OUTPATIENT)
Dept: CARDIOLOGY | Facility: CLINIC | Age: 82
End: 2022-03-22
Payer: MEDICARE

## 2022-03-22 VITALS
OXYGEN SATURATION: 95 % | HEART RATE: 60 BPM | WEIGHT: 182 LBS | HEIGHT: 64 IN | DIASTOLIC BLOOD PRESSURE: 76 MMHG | SYSTOLIC BLOOD PRESSURE: 128 MMHG | BODY MASS INDEX: 31.07 KG/M2

## 2022-03-22 VITALS
RESPIRATION RATE: 14 BRPM | DIASTOLIC BLOOD PRESSURE: 76 MMHG | WEIGHT: 182 LBS | HEIGHT: 64 IN | BODY MASS INDEX: 31.07 KG/M2 | OXYGEN SATURATION: 95 % | HEART RATE: 60 BPM | SYSTOLIC BLOOD PRESSURE: 128 MMHG

## 2022-03-22 DIAGNOSIS — R06.00 DYSPNEA, UNSPECIFIED: ICD-10-CM

## 2022-03-22 DIAGNOSIS — I38 ENDOCARDITIS, VALVE UNSPECIFIED: ICD-10-CM

## 2022-03-22 PROCEDURE — 93000 ELECTROCARDIOGRAM COMPLETE: CPT

## 2022-03-22 PROCEDURE — 99214 OFFICE O/P EST MOD 30 MIN: CPT

## 2022-03-22 NOTE — HISTORY OF PRESENT ILLNESS
[FreeTextEntry1] : Rebecca Randolph is a 81-year-old woman with a history of hypertension, hypercholesterolemia, obesity, GERD, sleep apnea (could not tolerate CPAP), asthma, pulmonary hypertension, melanoma, and Merkel cell carcinoma (diagnosed in April 2021) returns for cardiac examination after establishing care with me in October 2021.  She missed an appointment to see me in January.  She has been feeling well - mild dyspnea with climbing stairs but not with other activities; no chest discomfort.  She has been tolerating valsartan - I prescribed during our last encounter because of poorly controlled hypertension.

## 2022-03-22 NOTE — PHYSICAL EXAM
[Normal S1, S2] : normal S1, S2 [Clear Lung Fields] : clear lung fields [Soft] : abdomen soft [Non Tender] : non-tender [Moves all extremities] : moves all extremities [Alert and Oriented] : alert and oriented [No Acute Distress] : no acute distress [de-identified] : Overweight [de-identified] : Wearing a facemask [de-identified] : Bradycardic [de-identified] : Walks with cane

## 2022-03-22 NOTE — REVIEW OF SYSTEMS
[Dyspnea on exertion] : dyspnea during exertion [Negative] : Heme/Lymph [Feeling Fatigued] : feeling fatigued [Weight Loss (___ Lbs)] : no recent weight loss [Chest Discomfort] : no chest discomfort [Palpitations] : no palpitations

## 2022-03-22 NOTE — CARDIOLOGY SUMMARY
[de-identified] : 3/22/22.   Sinus  Bradycardia  [de-identified] : 12/16/2021. No ischemic ECG changes with administration of Regadenoson.  The left ventricle was normal in size. Nuclear SPECT perfusion imaging revealed mild anterior wall defects most consistent with breast attenuation artifact. Normal left ventricular function (ejection fraction greater than 70%). [de-identified] : 12/11/2021.  Normal left ventricular size and systolic function with estimated ejection fraction 59%. Mild concentric LVH. Calcified anterior mitral leaflet with minimal mitral regurgitation. Aortic valve sclerosis. Mild aortic regurgitation. Mild to moderate tricuspid regurgitation. Mild to moderate pulmonic regurgitation. Mild pulmonary hypertension.

## 2022-03-22 NOTE — DISCUSSION/SUMMARY
[With Me] : with me [___ Year(s)] : in [unfilled] year(s) [FreeTextEntry1] : \par Dyspnea on exertion: Dyspnea is most prevalent with climbing stairs (rather than with other activities of daily living); noninvasive cardiac testing last fall revealed the absence of inducible ischemia on stress testing and normal LV systolic function and mild valvular insufficiencies (with mild pulmonary hypertension) on echocardiography; today's ECG reveals sinus bradycardia but is otherwise normal; dyspnea likely related to deconditioning/obesity.\par \par Hypertension: Blood pressure has improved following recent titrations of antihypertensive agents; continue valsartan and metoprolol at present doses.\par \par Hypercholesterolemia: As factory control; continue atorvastatin.\par

## 2022-03-23 ENCOUNTER — TRANSCRIPTION ENCOUNTER (OUTPATIENT)
Age: 82
End: 2022-03-23

## 2022-03-24 ENCOUNTER — APPOINTMENT (OUTPATIENT)
Dept: INTERNAL MEDICINE | Facility: CLINIC | Age: 82
End: 2022-03-24
Payer: MEDICARE

## 2022-03-24 ENCOUNTER — LABORATORY RESULT (OUTPATIENT)
Age: 82
End: 2022-03-24

## 2022-03-24 VITALS — DIASTOLIC BLOOD PRESSURE: 80 MMHG | SYSTOLIC BLOOD PRESSURE: 136 MMHG

## 2022-03-24 VITALS
HEART RATE: 65 BPM | OXYGEN SATURATION: 95 % | RESPIRATION RATE: 14 BRPM | TEMPERATURE: 208.76 F | SYSTOLIC BLOOD PRESSURE: 136 MMHG | HEIGHT: 64 IN | DIASTOLIC BLOOD PRESSURE: 74 MMHG | WEIGHT: 181 LBS | BODY MASS INDEX: 30.9 KG/M2

## 2022-03-24 DIAGNOSIS — I27.20 PULMONARY HYPERTENSION, UNSPECIFIED: Chronic | ICD-10-CM

## 2022-03-24 DIAGNOSIS — Z87.891 PERSONAL HISTORY OF NICOTINE DEPENDENCE: ICD-10-CM

## 2022-03-24 PROCEDURE — 36415 COLL VENOUS BLD VENIPUNCTURE: CPT

## 2022-03-24 PROCEDURE — 99214 OFFICE O/P EST MOD 30 MIN: CPT | Mod: 25

## 2022-03-24 RX ORDER — OFLOXACIN 3 MG/ML
0.3 SOLUTION/ DROPS OPHTHALMIC
Qty: 5 | Refills: 0 | Status: DISCONTINUED | COMMUNITY
Start: 2021-09-24

## 2022-03-24 RX ORDER — AMOXICILLIN 500 MG/1
500 CAPSULE ORAL
Qty: 15 | Refills: 0 | Status: DISCONTINUED | COMMUNITY
Start: 2021-12-09

## 2022-03-24 NOTE — PHYSICAL EXAM
[No Acute Distress] : no acute distress [Well Nourished] : well nourished [Well Developed] : well developed [Well-Appearing] : well-appearing [Normal Sclera/Conjunctiva] : normal sclera/conjunctiva [PERRL] : pupils equal round and reactive to light [EOMI] : extraocular movements intact [Normal Outer Ear/Nose] : the outer ears and nose were normal in appearance [Normal Oropharynx] : the oropharynx was normal [No JVD] : no jugular venous distention [No Lymphadenopathy] : no lymphadenopathy [Supple] : supple [Thyroid Normal, No Nodules] : the thyroid was normal and there were no nodules present [No Respiratory Distress] : no respiratory distress  [No Accessory Muscle Use] : no accessory muscle use [Clear to Auscultation] : lungs were clear to auscultation bilaterally [Normal Rate] : normal rate  [Regular Rhythm] : with a regular rhythm [Normal S1, S2] : normal S1 and S2 [No Murmur] : no murmur heard [No Carotid Bruits] : no carotid bruits [No Abdominal Bruit] : a ~M bruit was not heard ~T in the abdomen [No Varicosities] : no varicosities [Pedal Pulses Present] : the pedal pulses are present [No Palpable Aorta] : no palpable aorta [No Extremity Clubbing/Cyanosis] : no extremity clubbing/cyanosis [Soft] : abdomen soft [Non Tender] : non-tender [Non-distended] : non-distended [No Masses] : no abdominal mass palpated [No HSM] : no HSM [Normal Bowel Sounds] : normal bowel sounds [Normal Posterior Cervical Nodes] : no posterior cervical lymphadenopathy [Normal Anterior Cervical Nodes] : no anterior cervical lymphadenopathy [No CVA Tenderness] : no CVA  tenderness [No Spinal Tenderness] : no spinal tenderness [No Joint Swelling] : no joint swelling [Grossly Normal Strength/Tone] : grossly normal strength/tone [No Rash] : no rash [Coordination Grossly Intact] : coordination grossly intact [No Focal Deficits] : no focal deficits [Normal Gait] : normal gait [Deep Tendon Reflexes (DTR)] : deep tendon reflexes were 2+ and symmetric [Normal Affect] : the affect was normal [Normal Insight/Judgement] : insight and judgment were intact [de-identified] : trace edema in lower extremities bilaterally  [de-identified] : overweight

## 2022-03-24 NOTE — HISTORY OF PRESENT ILLNESS
[FreeTextEntry1] : fasting blood work and general follow-up\par  [de-identified] : Patient is a 81 year old female with a past medical history as below who presents for fasting blood work and general follow-up. Patient states she is taking all medications as prescribed and denies any adverse reactions or side effects. She denies lightheadedness or dizziness on Valsartan and Metoprolol Tartrate. She denies any new arthralgias or myalgias on Atorvastatin Calcium. GERD has been well-managed on Omeprazole. 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 Mckeon. She is s/p nasolabial flap reconstruction on 7/7/21 with plastic surgeon, Dr. Owen. Nuclear Stress Test with cardiologist, Dr. Campuzano dated 12/16/21 was probably normal. Patient did receive the flu vaccine for this season. Patient has received the booster dose of the COVID-19 Vaccine (Moderna). She inquires about testing for COVID-19 antibodies with blood work today to evaluate her response to the vaccine. UA dated 3/24/21 revealed epithelial cells (7/HPF).

## 2022-03-24 NOTE — ASSESSMENT
[FreeTextEntry1] : Patient is a 81 year old female with a past medical history as above who presents for fasting blood work and general follow-up.\par

## 2022-03-24 NOTE — HEALTH RISK ASSESSMENT
[Former] : Former [No] : In the past 12 months have you used drugs other than those required for medical reasons? No [No falls in past year] : Patient reported no falls in the past year [0] : 1) Little interest or pleasure doing things: Not at all (0) [1] : 2) Feeling down, depressed, or hopeless for several days (1) [PHQ-2 Negative - No further assessment needed] : PHQ-2 Negative - No further assessment needed [Fully functional (bathing, dressing, toileting, transferring, walking, feeding)] : Fully functional (bathing, dressing, toileting, transferring, walking, feeding) [Fully functional (using the telephone, shopping, preparing meals, housekeeping, doing laundry, using] : Fully functional and needs no help or supervision to perform IADLs (using the telephone, shopping, preparing meals, housekeeping, doing laundry, using transportation, managing medications and managing finances) [Audit-CScore] : 0 [[id number]] : 1 [MammogramDate] : 03/22 [MammogramComments] : BI-RADS Category 2: Benign.  [BoneDensityDate] : 05/19 [BoneDensityComments] : Osteopenia.  [ColonoscopyComments] : Sigmoid polyp and internal hemorrhoids.  [ColonoscopyDate] : 08/13

## 2022-03-24 NOTE — PLAN
[FreeTextEntry1] : Cardiology\par hypertension - continue Valsartan 80mg p.o.q.d. as directed; continue Metoprolol Tartrate 25mg TID p.o.q.d. as directed - check CMP - continue low sodium diet and weight loss; will continue to monitor BP\par hypercholesterolemia - continue Atorvastatin Calcium 40mg p.o.q.d. as directed - continue low cholesterol/low fat diet - check FLP and LFTs\par hypertriglyceridemia - continue Omega-3 Fish Oil 1000mg 2 tablets BID p.o.q.d. as directed - continue low cholesterol/low fat and low carbohydrate/low sugar diet - check FLP \par Endocrinology\par obesity - continue low carbohydrate diet; previously recommended gradually increasing CV exercise - check TSH\par hyperglycemia - continue low carbohydrate/low sugar diet - check hemoglobin A1C\par Continue Vitamin D-3 1000 IU p.o.q.d. with a meal as directed\par Gastroenterology\par GERD - continue Omeprazole 20mg p.o.q.d. as directed - continue antireflux measures\par Dermatology/Oncology\par S/p excision of Merkel cell carcinoma; s/p nasolabial flap reconstruction and revision with takedown - follow up with surgeon, Dr. Mckeon and plastic surgeon, Dr. wOen as necessary \par Urology\par abnormal UA dated 3/24/21 - secondary to contaminated specimen - recheck UA w/ Reflex Urine Culture\par Immunization\par S/p COVID-19 Vaccine (Moderna, x3) - check COVID-19 Mt Domain Antibody\par \par check CBC, CMP, FLP, hemoglobin A1C, TSH, COVID-19 Mt Domain Antibody, and UA w/ Reflex Urine Culture

## 2022-03-24 NOTE — ADDENDUM
[FreeTextEntry1] : I, Maxx Cevallos, acted solely as scribe for Dr. Maycol Saldana DO on this date 03/24/2022  9:20AM .\par \par All medical record entries made by the Scribe were at my, Dr. Maycol Saldana DO direction and personally dictated by me on 03/24/2022  9:20AM. I have reviewed the chart and agree that the record accurately reflects my personal performance of the history, physical exam, assessment and plan. I have also personally directed, reviewed and agreed with the chart.\par

## 2022-03-27 ENCOUNTER — TRANSCRIPTION ENCOUNTER (OUTPATIENT)
Age: 82
End: 2022-03-27

## 2022-03-27 LAB
ALBUMIN SERPL ELPH-MCNC: 4.3 G/DL
ALP BLD-CCNC: 85 U/L
ALT SERPL-CCNC: 19 U/L
ANION GAP SERPL CALC-SCNC: 14 MMOL/L
APPEARANCE: CLEAR
AST SERPL-CCNC: 19 U/L
BASOPHILS # BLD AUTO: 0.03 K/UL
BASOPHILS NFR BLD AUTO: 0.5 %
BILIRUB SERPL-MCNC: 0.6 MG/DL
BILIRUBIN URINE: NEGATIVE
BLOOD URINE: NEGATIVE
BUN SERPL-MCNC: 16 MG/DL
CALCIUM SERPL-MCNC: 9.4 MG/DL
CHLORIDE SERPL-SCNC: 105 MMOL/L
CHOLEST SERPL-MCNC: 179 MG/DL
CO2 SERPL-SCNC: 23 MMOL/L
COLOR: NORMAL
CREAT SERPL-MCNC: 0.7 MG/DL
EGFR: 88 ML/MIN/1.73M2
EOSINOPHIL # BLD AUTO: 0.19 K/UL
EOSINOPHIL NFR BLD AUTO: 3.1 %
ESTIMATED AVERAGE GLUCOSE: 123 MG/DL
GLUCOSE QUALITATIVE U: NEGATIVE
GLUCOSE SERPL-MCNC: 108 MG/DL
HBA1C MFR BLD HPLC: 5.9 %
HCT VFR BLD CALC: 39.4 %
HDLC SERPL-MCNC: 45 MG/DL
HGB BLD-MCNC: 12.6 G/DL
IMM GRANULOCYTES NFR BLD AUTO: 0.2 %
KETONES URINE: NEGATIVE
LDLC SERPL CALC-MCNC: 101 MG/DL
LEUKOCYTE ESTERASE URINE: ABNORMAL
LYMPHOCYTES # BLD AUTO: 2.63 K/UL
LYMPHOCYTES NFR BLD AUTO: 43.3 %
MAN DIFF?: NORMAL
MCHC RBC-ENTMCNC: 29.2 PG
MCHC RBC-ENTMCNC: 32 GM/DL
MCV RBC AUTO: 91.2 FL
MONOCYTES # BLD AUTO: 0.56 K/UL
MONOCYTES NFR BLD AUTO: 9.2 %
NEUTROPHILS # BLD AUTO: 2.65 K/UL
NEUTROPHILS NFR BLD AUTO: 43.7 %
NITRITE URINE: NEGATIVE
NONHDLC SERPL-MCNC: 135 MG/DL
PH URINE: 6
PLATELET # BLD AUTO: 243 K/UL
POTASSIUM SERPL-SCNC: 4.3 MMOL/L
PROT SERPL-MCNC: 7.3 G/DL
PROTEIN URINE: ABNORMAL
RBC # BLD: 4.32 M/UL
RBC # FLD: 14.4 %
SODIUM SERPL-SCNC: 141 MMOL/L
SPECIFIC GRAVITY URINE: >=1.03
TRIGL SERPL-MCNC: 170 MG/DL
TSH SERPL-ACNC: 3.15 UIU/ML
UROBILINOGEN URINE: NORMAL
WBC # FLD AUTO: 6.07 K/UL

## 2022-04-01 ENCOUNTER — RX RENEWAL (OUTPATIENT)
Age: 82
End: 2022-04-01

## 2022-04-16 ENCOUNTER — TRANSCRIPTION ENCOUNTER (OUTPATIENT)
Age: 82
End: 2022-04-16

## 2022-04-16 LAB
COVID-19 SPIKE DOMAIN ANTIBODY INTERPRETATION: POSITIVE
SARS-COV-2 AB SERPL IA-ACNC: >250 U/ML

## 2022-06-28 ENCOUNTER — RX RENEWAL (OUTPATIENT)
Age: 82
End: 2022-06-28

## 2022-07-02 ENCOUNTER — RX RENEWAL (OUTPATIENT)
Age: 82
End: 2022-07-02

## 2022-08-10 NOTE — H&P PST ADULT - SKIN/BREAST
[FreeTextEntry1] : abdominal pain [de-identified] : 46yo F with hx of pelvic pain here for follow up\par \par Abd pain - saw GI who recommended CT abd/pelvis - but it was denied by the insurance\par        pain improves when her menses start\par \par ABle to schedule with uro-gyn --needs referral\par \par med rec performed negative

## 2022-08-14 ENCOUNTER — RX RENEWAL (OUTPATIENT)
Age: 82
End: 2022-08-14

## 2022-09-15 ENCOUNTER — RX RENEWAL (OUTPATIENT)
Age: 82
End: 2022-09-15

## 2022-09-19 ENCOUNTER — TRANSCRIPTION ENCOUNTER (OUTPATIENT)
Age: 82
End: 2022-09-19

## 2022-10-04 ENCOUNTER — APPOINTMENT (OUTPATIENT)
Dept: INTERNAL MEDICINE | Facility: CLINIC | Age: 82
End: 2022-10-04

## 2022-10-04 VITALS
RESPIRATION RATE: 16 BRPM | HEIGHT: 64 IN | SYSTOLIC BLOOD PRESSURE: 130 MMHG | DIASTOLIC BLOOD PRESSURE: 84 MMHG | BODY MASS INDEX: 31.58 KG/M2 | HEART RATE: 50 BPM | TEMPERATURE: 97 F | OXYGEN SATURATION: 96 % | WEIGHT: 185 LBS

## 2022-10-04 DIAGNOSIS — G47.00 INSOMNIA, UNSPECIFIED: ICD-10-CM

## 2022-10-04 DIAGNOSIS — Z20.822 CONTACT WITH AND (SUSPECTED) EXPOSURE TO COVID-19: ICD-10-CM

## 2022-10-04 DIAGNOSIS — C4A.30 MERKEL CELL CARCINOMA OF UNSPECIFIED PART OF FACE: ICD-10-CM

## 2022-10-04 PROCEDURE — 99214 OFFICE O/P EST MOD 30 MIN: CPT | Mod: 25

## 2022-10-04 PROCEDURE — 36415 COLL VENOUS BLD VENIPUNCTURE: CPT

## 2022-10-05 PROBLEM — G47.00 INSOMNIA: Status: ACTIVE | Noted: 2022-10-04

## 2022-10-05 PROBLEM — Z20.822 ENCOUNTER FOR LABORATORY TESTING FOR COVID-19 VIRUS: Status: ACTIVE | Noted: 2021-03-24

## 2022-10-05 PROBLEM — C4A.30 MERKEL CELL CARCINOMA OF FACE: Status: ACTIVE | Noted: 2021-05-04

## 2022-10-05 NOTE — REVIEW OF SYSTEMS
[Memory Loss] : memory loss [Negative] : Heme/Lymph [Back Pain] : back pain [Insomnia] : insomnia [FreeTextEntry7] : decreased appetite  [FreeTextEntry9] : gait instability  [de-identified] : pins/needles sensation in the digits of the hands and the digits of the left foot

## 2022-10-05 NOTE — HISTORY OF PRESENT ILLNESS
[FreeTextEntry1] : fasting blood work and general follow-up\par  [de-identified] : Patient is a 81 year old female with a past medical history as below who presents for fasting blood work and general follow-up. Patient states she is taking all medications as prescribed and denies any adverse reactions or side effects. She denies lightheadedness or dizziness on Valsartan and Metoprolol Tartrate. GERD has been well-managed on Omeprazole. She denies breakthrough acid reflux. Patient discontinued Glucosamine/Chondroitin. 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 Mckeon. She is s/p nasolabial flap reconstruction on 7/7/21 with plastic surgeon, Dr. Owen.\par \par Patient's last screening colonoscopy was in November 2013 with gastroenterologist, Dr. Padilla.\par \par Compression stockings was previously recommended for dermatosclerosis. \par \par Patient has noted decreased appetite.\par \par Patient notes lower back pain. She has been taking Aleve on an as-needed basis which helps.\par Patient has noted gait instability. She uses a cane when ambulating.\par \par Patient has noted issues with memory. \par Patient occasionally notes pins/needles sensation in the digits of the hands and the digits of the left foot. She denies pain in the digits of the hands and feet. She notes seeing neurology in the past for work-up; reportedly no indication for treatment. \par \par Patient has been taking Unisom to help her sleep.\par \par Patient already received the high-dose flu vaccine for this season. She received the Prevnar 13 in 2015. She did receive the Pneumovax 23 after 67 (likely 2010). She last received the Tetanus Vaccine in March 2008. She is vaccinated against Shingles. Patient recently received the updated COVID-19 vaccine booster. She inquires about testing for COVID-19 antibodies with blood work today.

## 2022-10-05 NOTE — ADDENDUM
[FreeTextEntry1] : I, Maxx Cevallos, acted solely as scribe for Dr. Maycol Saldana DO on this date 10/04/2022  2:50PM .\par \par All medical record entries made by the Scribe were at my, Dr. Maycol Saldana DO direction and personally dictated by me on 10/04/2022  2:50PM. I have reviewed the chart and agree that the record accurately reflects my personal performance of the history, physical exam, assessment and plan. I have also personally directed, reviewed and agreed with the chart.

## 2022-10-05 NOTE — PLAN
[FreeTextEntry1] : Cardiology\par hypertension - continue Valsartan 80mg p.o.q.d. as directed; continue Metoprolol Tartrate 25mg 1.5 tablets BID p.o.q.d. as directed - check CMP - continue low sodium diet; advised weight loss; will continue to monitor BP\par hypercholesterolemia - continue Atorvastatin Calcium 40mg p.o.q.d. as directed - advised low cholesterol/low fat diet - check FLP and LFTs\par hypertriglyceridemia - continue Omega-3 Fish Oil 1000mg BID p.o.q.d. as directed - advised low cholesterol/low fat and low carbohydrate/low sugar diet - check FLP \par Endocrinology\par hyperglycemia - advised low carbohydrate/low sugar diet; previously recommended gradually increasing CV exercise - check hemoglobin A1C\par obesity - advised low carbohydrate diet; previously recommended gradually increasing CV exercise \par Continue Vitamin D-3 1000 IU p.o.q.d. with a meal as directed - check Vitamin D \par Gastroenterology\par GERD - recommended trial off Omeprazole 20mg - discussed antireflux measures - advised against spicy food consumption - advised against tomato or tomato product consumption - advised against citrus fruit/juice consumption - advised against peppermint/chocolate consumption - advised against caffeinated/carbonated beverage consumption - advised smaller, more frequent meals - advised sitting upright for 3 hours after meals - if symptoms (heartburn/belching) are only noted 1-2x per week, recommended OTC Tums as needed; if symptoms are noted more frequently than 2x per week, recommended restarting Omeprazole \par Dermatology/Oncology\par S/p excision of Merkel cell carcinoma; s/p nasolabial flap reconstruction and revision with takedown - follow up with surgeon, Dr. Mckeon and plastic surgeon, Dr. Owen as necessary \par Musculoskeletal\par lower back pain - advised patient to always take Aleve with food; advised against regularly taking any OTC NSAIDs (Aleve, Advil, Motrin) given increased risk for GI ulcers/bleeding and progressive damage to kidneys/liver\par Neurology\par pins/needles sensation in digits of hands/feet - if the sensation persists or worsens, recommended seeing neurology for further evaluation/treatment \par Psychiatry\par insomnia - discontinue Unisom; start Trazodone 50mg p.o.q.h.s. as directed, Rx filled; advised to increase dosage to 100mg if sleep does not improve on 50mg\par Immunization\par Tdap (Adacel) Vaccine - discussed, patient to consider and follow up for vaccine administration if interested\par S/p COVID-19 Vaccine (?x5) - check COVID-19 Mt Domain Antibody\par Infectious Disease\par Check COVID-19 Nucleocapsid Antibody\par \par check CMP, FLP, hemoglobin A1C, Vitamin D, COVID-19 Mt Domain Antibody, and COVID-19 Nucleocapsid Antibody

## 2022-10-05 NOTE — HEALTH RISK ASSESSMENT
[Former] : Former [No] : In the past 12 months have you used drugs other than those required for medical reasons? No [No falls in past year] : Patient reported no falls in the past year [0] : 1) Little interest or pleasure doing things: Not at all (0) [1] : 2) Feeling down, depressed, or hopeless for several days (1) [PHQ-2 Negative - No further assessment needed] : PHQ-2 Negative - No further assessment needed [Fully functional (bathing, dressing, toileting, transferring, walking, feeding)] : Fully functional (bathing, dressing, toileting, transferring, walking, feeding) [Fully functional (using the telephone, shopping, preparing meals, housekeeping, doing laundry, using] : Fully functional and needs no help or supervision to perform IADLs (using the telephone, shopping, preparing meals, housekeeping, doing laundry, using transportation, managing medications and managing finances) [Audit-CScore] : 0 [[id number]] : 1 [MammogramDate] : 03/22 [MammogramComments] : BI-RADS Category 2: Benign.  [BoneDensityDate] : 05/19 [BoneDensityComments] : Osteopenia.  [ColonoscopyDate] : 08/13 [ColonoscopyComments] : Sigmoid polyp and internal hemorrhoids.

## 2022-10-05 NOTE — PHYSICAL EXAM
[No Acute Distress] : no acute distress [Well Nourished] : well nourished [Well Developed] : well developed [Well-Appearing] : well-appearing [Normal Sclera/Conjunctiva] : normal sclera/conjunctiva [PERRL] : pupils equal round and reactive to light [EOMI] : extraocular movements intact [Normal Outer Ear/Nose] : the outer ears and nose were normal in appearance [Normal Oropharynx] : the oropharynx was normal [No JVD] : no jugular venous distention [No Lymphadenopathy] : no lymphadenopathy [Supple] : supple [Thyroid Normal, No Nodules] : the thyroid was normal and there were no nodules present [No Respiratory Distress] : no respiratory distress  [No Accessory Muscle Use] : no accessory muscle use [Clear to Auscultation] : lungs were clear to auscultation bilaterally [Normal Rate] : normal rate  [Regular Rhythm] : with a regular rhythm [Normal S1, S2] : normal S1 and S2 [No Murmur] : no murmur heard [No Carotid Bruits] : no carotid bruits [No Abdominal Bruit] : a ~M bruit was not heard ~T in the abdomen [No Varicosities] : no varicosities [Pedal Pulses Present] : the pedal pulses are present [No Palpable Aorta] : no palpable aorta [No Extremity Clubbing/Cyanosis] : no extremity clubbing/cyanosis [Soft] : abdomen soft [Non Tender] : non-tender [Non-distended] : non-distended [No Masses] : no abdominal mass palpated [No HSM] : no HSM [Normal Bowel Sounds] : normal bowel sounds [Normal Posterior Cervical Nodes] : no posterior cervical lymphadenopathy [Normal Anterior Cervical Nodes] : no anterior cervical lymphadenopathy [No CVA Tenderness] : no CVA  tenderness [No Spinal Tenderness] : no spinal tenderness [No Joint Swelling] : no joint swelling [Grossly Normal Strength/Tone] : grossly normal strength/tone [No Rash] : no rash [Coordination Grossly Intact] : coordination grossly intact [No Focal Deficits] : no focal deficits [Normal Gait] : normal gait [Deep Tendon Reflexes (DTR)] : deep tendon reflexes were 2+ and symmetric [Normal Affect] : the affect was normal [Normal Insight/Judgement] : insight and judgment were intact [Normal Voice/Communication] : normal voice/communication [Normal TMs] : both tympanic membranes were normal [Normal Nasal Mucosa] : the nasal mucosa was normal [Normal Supraclavicular Nodes] : no supraclavicular lymphadenopathy [Kyphosis] : kyphosis [Acne] : no acne [Speech Grossly Normal] : speech grossly normal [Memory Grossly Normal] : memory grossly normal [Alert and Oriented x3] : oriented to person, place, and time [Normal Mood] : the mood was normal [de-identified] : trace edema in lower extremities bilaterally  [de-identified] : overweight

## 2022-10-06 ENCOUNTER — TRANSCRIPTION ENCOUNTER (OUTPATIENT)
Age: 82
End: 2022-10-06

## 2022-10-08 ENCOUNTER — NON-APPOINTMENT (OUTPATIENT)
Age: 82
End: 2022-10-08

## 2022-10-08 LAB
25(OH)D3 SERPL-MCNC: 50.5 NG/ML
ALBUMIN SERPL ELPH-MCNC: 4.4 G/DL
ALP BLD-CCNC: 82 U/L
ALT SERPL-CCNC: 31 U/L
ANION GAP SERPL CALC-SCNC: 13 MMOL/L
AST SERPL-CCNC: 30 U/L
BILIRUB SERPL-MCNC: 0.7 MG/DL
BUN SERPL-MCNC: 19 MG/DL
CALCIUM SERPL-MCNC: 9.7 MG/DL
CHLORIDE SERPL-SCNC: 102 MMOL/L
CHOLEST SERPL-MCNC: 192 MG/DL
CO2 SERPL-SCNC: 26 MMOL/L
CREAT SERPL-MCNC: 0.7 MG/DL
EGFR: 88 ML/MIN/1.73M2
ESTIMATED AVERAGE GLUCOSE: 120 MG/DL
GLUCOSE SERPL-MCNC: 97 MG/DL
HBA1C MFR BLD HPLC: 5.8 %
HDLC SERPL-MCNC: 46 MG/DL
LDLC SERPL CALC-MCNC: 113 MG/DL
NONHDLC SERPL-MCNC: 147 MG/DL
POTASSIUM SERPL-SCNC: 4.3 MMOL/L
PROT SERPL-MCNC: 7.3 G/DL
SODIUM SERPL-SCNC: 140 MMOL/L
TRIGL SERPL-MCNC: 170 MG/DL

## 2022-11-13 ENCOUNTER — TRANSCRIPTION ENCOUNTER (OUTPATIENT)
Age: 82
End: 2022-11-13

## 2022-11-15 ENCOUNTER — RX RENEWAL (OUTPATIENT)
Age: 82
End: 2022-11-15

## 2022-12-11 ENCOUNTER — RX RENEWAL (OUTPATIENT)
Age: 82
End: 2022-12-11

## 2023-01-05 RX ORDER — TRAZODONE HYDROCHLORIDE 50 MG/1
50 TABLET ORAL
Qty: 90 | Refills: 0 | Status: DISCONTINUED | COMMUNITY
Start: 2022-10-04 | End: 2023-01-05

## 2023-01-17 ENCOUNTER — TRANSCRIPTION ENCOUNTER (OUTPATIENT)
Age: 83
End: 2023-01-17

## 2023-01-18 ENCOUNTER — RX RENEWAL (OUTPATIENT)
Age: 83
End: 2023-01-18

## 2023-01-19 ENCOUNTER — TRANSCRIPTION ENCOUNTER (OUTPATIENT)
Age: 83
End: 2023-01-19

## 2023-02-07 ENCOUNTER — RX RENEWAL (OUTPATIENT)
Age: 83
End: 2023-02-07

## 2023-04-20 ENCOUNTER — RX RENEWAL (OUTPATIENT)
Age: 83
End: 2023-04-20

## 2023-04-26 ENCOUNTER — APPOINTMENT (OUTPATIENT)
Dept: ORTHOPEDIC SURGERY | Facility: CLINIC | Age: 83
End: 2023-04-26
Payer: MEDICARE

## 2023-04-26 VITALS — WEIGHT: 185 LBS | HEIGHT: 64 IN | BODY MASS INDEX: 31.58 KG/M2

## 2023-04-26 PROCEDURE — 99214 OFFICE O/P EST MOD 30 MIN: CPT

## 2023-04-26 PROCEDURE — 72100 X-RAY EXAM L-S SPINE 2/3 VWS: CPT

## 2023-04-26 PROCEDURE — 73502 X-RAY EXAM HIP UNI 2-3 VIEWS: CPT

## 2023-04-26 NOTE — PHYSICAL EXAM
[Extension] : extension [Bending to left] : bending to left [Bending to right] : bending to right [Straightening consistent with spasm] : Straightening consistent with spasm [Facet arthropathy] : Facet arthropathy [Disc space narrowing] : Disc space narrowing [Scoliosis] : Scoliosis [AP] : anteroposterior [Mild arthritis (Tonnis Grade 1)] : Mild arthritis (Tonnis Grade 1) [] : negative sitting straight leg raise

## 2023-04-26 NOTE — HISTORY OF PRESENT ILLNESS
[Lower back] : lower back [Gradual] : gradual [8] : 8 [3] : 3 [Dull/Aching] : dull/aching [Radiating] : radiating [Sharp] : sharp [Frequent] : frequent [Household chores] : household chores [Leisure] : leisure [Rest] : rest [Walking] : walking [Retired] : Work status: retired [] : Post Surgical Visit: no [FreeTextEntry1] : R Hip  [FreeTextEntry7] : R Hip [de-identified] : 10/13/2021 [de-identified] : Dr. Richardson  [de-identified] : MRI

## 2023-04-26 NOTE — REASON FOR VISIT
[FreeTextEntry2] : Patient complains of L Back pain and acute R Hip pain x 1 week ago. No injury or trauma, Pain when walking and worse getting up from sitting. She was seen in Oct 2021 for her L Back and Hips.

## 2023-05-11 ENCOUNTER — TRANSCRIPTION ENCOUNTER (OUTPATIENT)
Age: 83
End: 2023-05-11

## 2023-05-21 ENCOUNTER — RX RENEWAL (OUTPATIENT)
Age: 83
End: 2023-05-21

## 2023-05-31 ENCOUNTER — APPOINTMENT (OUTPATIENT)
Dept: ORTHOPEDIC SURGERY | Facility: CLINIC | Age: 83
End: 2023-05-31
Payer: MEDICARE

## 2023-05-31 ENCOUNTER — FORM ENCOUNTER (OUTPATIENT)
Age: 83
End: 2023-05-31

## 2023-05-31 VITALS — WEIGHT: 185 LBS | HEIGHT: 64 IN | BODY MASS INDEX: 31.58 KG/M2

## 2023-05-31 PROCEDURE — 99213 OFFICE O/P EST LOW 20 MIN: CPT

## 2023-05-31 NOTE — HISTORY OF PRESENT ILLNESS
[Lower back] : lower back [Gradual] : gradual [Dull/Aching] : dull/aching [Intermittent] : intermittent [Walking] : walking [Retired] : Work status: retired [] : Post Surgical Visit: no [de-identified] : 4/26/23 [de-identified] : DR. Richardson [de-identified] : 5/30/23

## 2023-05-31 NOTE — PHYSICAL EXAM
[Extension] : extension [Bending to left] : bending to left [Bending to right] : bending to right [] : negative sitting straight leg raise

## 2023-05-31 NOTE — REASON FOR VISIT
[FreeTextEntry2] : Patient complains of continued Lower Back discomfort since her last visit. Discomfort increases with walking. Acute R Hip has subsided. Some tingling in B Feet. Patient has been going to PT 2x a week with little relief.

## 2023-06-01 ENCOUNTER — APPOINTMENT (OUTPATIENT)
Dept: MRI IMAGING | Facility: CLINIC | Age: 83
End: 2023-06-01
Payer: MEDICARE

## 2023-06-01 PROCEDURE — 72148 MRI LUMBAR SPINE W/O DYE: CPT | Mod: MH

## 2023-06-06 ENCOUNTER — APPOINTMENT (OUTPATIENT)
Dept: ORTHOPEDIC SURGERY | Facility: CLINIC | Age: 83
End: 2023-06-06
Payer: MEDICARE

## 2023-06-06 VITALS — HEIGHT: 64 IN | BODY MASS INDEX: 31.58 KG/M2 | WEIGHT: 185 LBS

## 2023-06-06 PROCEDURE — 99214 OFFICE O/P EST MOD 30 MIN: CPT

## 2023-06-06 NOTE — REASON FOR VISIT
[FreeTextEntry2] : Patient complains of continued discomfort L Back. She would like to review MRI results: multilevel degenerative, bulging discs, spondylolisthesis

## 2023-06-06 NOTE — HISTORY OF PRESENT ILLNESS
[Lower back] : lower back [Gradual] : gradual [Dull/Aching] : dull/aching [Constant] : constant [Household chores] : household chores [Leisure] : leisure [Rest] : rest [Walking] : walking [Retired] : Work status: retired [] : Post Surgical Visit: no [de-identified] : 5/31/2023 [de-identified] : Dr. Richardson  [de-identified] : MRI

## 2023-06-18 ENCOUNTER — RX RENEWAL (OUTPATIENT)
Age: 83
End: 2023-06-18

## 2023-06-27 ENCOUNTER — APPOINTMENT (OUTPATIENT)
Dept: CARDIOLOGY | Facility: CLINIC | Age: 83
End: 2023-06-27
Payer: MEDICARE

## 2023-06-27 ENCOUNTER — NON-APPOINTMENT (OUTPATIENT)
Age: 83
End: 2023-06-27

## 2023-06-27 VITALS
WEIGHT: 185 LBS | HEIGHT: 64 IN | HEART RATE: 60 BPM | DIASTOLIC BLOOD PRESSURE: 62 MMHG | OXYGEN SATURATION: 95 % | SYSTOLIC BLOOD PRESSURE: 112 MMHG | RESPIRATION RATE: 15 BRPM | BODY MASS INDEX: 31.58 KG/M2

## 2023-06-27 VITALS
HEART RATE: 60 BPM | WEIGHT: 185 LBS | OXYGEN SATURATION: 95 % | SYSTOLIC BLOOD PRESSURE: 112 MMHG | DIASTOLIC BLOOD PRESSURE: 62 MMHG | BODY MASS INDEX: 31.76 KG/M2

## 2023-06-27 PROCEDURE — 93000 ELECTROCARDIOGRAM COMPLETE: CPT

## 2023-06-27 PROCEDURE — 99214 OFFICE O/P EST MOD 30 MIN: CPT

## 2023-06-27 NOTE — HISTORY OF PRESENT ILLNESS
[FreeTextEntry1] : Rebecca Randolph is an 82-year-old woman with a history of hypertension, hypercholesterolemia, obesity, GERD, sleep apnea (could not tolerate CPAP), asthma, pulmonary hypertension, melanoma, and Merkel cell carcinoma (diagnosed in 2021) who returns for cardiac examination - I last saw her in March 2022.\par \par She has been feeling well since I last saw her–no new concerns offered or elicited during today's visit.  Previous complaints of dyspnea seem to have improved.  She has not been experiencing chest discomfort or palpitations.  She has been tolerating prescribed pharmacotherapy and requests that I refill her valsartan.

## 2023-06-27 NOTE — PHYSICAL EXAM
[No Acute Distress] : no acute distress [Normal S1, S2] : normal S1, S2 [Clear Lung Fields] : clear lung fields [Non Tender] : non-tender [Alert and Oriented] : alert and oriented [Obese] : obese [No Murmur] : no murmur [Abnormal Gait] : abnormal gait [No Edema] : no edema [Normal Speech] : normal speech [de-identified] : Wearing a facemask [de-identified] : Walks with cane

## 2023-06-27 NOTE — CARDIOLOGY SUMMARY
[de-identified] : 6/27/2023.  Sinus  Rhythm [de-identified] : 12/16/2021. No ischemic ECG changes with administration of Regadenoson.  The left ventricle was normal in size. Nuclear SPECT perfusion imaging revealed mild anterior wall defects most consistent with breast attenuation artifact. Normal left ventricular function (ejection fraction greater than 70%). [de-identified] : 12/11/2021.  Normal left ventricular size and systolic function with estimated ejection fraction 59%. Mild concentric LVH. Calcified anterior mitral leaflet with minimal mitral regurgitation. Aortic valve sclerosis. Mild aortic regurgitation. Mild to moderate tricuspid regurgitation. Mild to moderate pulmonic regurgitation. Mild pulmonary hypertension.

## 2023-06-27 NOTE — DISCUSSION/SUMMARY
[With Me] : with me [___ Year(s)] : in [unfilled] year(s) [FreeTextEntry1] : \par Hypertension: Controlled and tolerating present antihypertensive regimen; continue valsartan and metoprolol.\par \par Hypercholesterolemia:  suboptimal control with LDL greater than 100 despite use of atorvastatin; patient says she "eats like a child."   We discussed dietary modification and continued use of atorvastatin 40 mg daily.\par

## 2023-06-27 NOTE — REVIEW OF SYSTEMS
[Negative] : Heme/Lymph [SOB] : no shortness of breath [Chest Discomfort] : no chest discomfort [Palpitations] : no palpitations [FreeTextEntry9] :   Back pain

## 2023-07-06 ENCOUNTER — RX RENEWAL (OUTPATIENT)
Age: 83
End: 2023-07-06

## 2023-07-16 ENCOUNTER — RX RENEWAL (OUTPATIENT)
Age: 83
End: 2023-07-16

## 2023-07-18 ENCOUNTER — APPOINTMENT (OUTPATIENT)
Dept: ORTHOPEDIC SURGERY | Facility: CLINIC | Age: 83
End: 2023-07-18
Payer: MEDICARE

## 2023-07-18 VITALS — HEIGHT: 64 IN | BODY MASS INDEX: 31.58 KG/M2 | WEIGHT: 185 LBS

## 2023-07-18 PROCEDURE — 99213 OFFICE O/P EST LOW 20 MIN: CPT

## 2023-07-18 RX ORDER — DICLOFENAC SODIUM 50 MG/1
50 TABLET, DELAYED RELEASE ORAL
Qty: 60 | Refills: 1 | Status: ACTIVE | COMMUNITY
Start: 2023-07-18 | End: 1900-01-01

## 2023-07-18 NOTE — HISTORY OF PRESENT ILLNESS
[Lower back] : lower back [Gradual] : gradual [Intermittent] : intermittent [Retired] : Work status: retired [] : Post Surgical Visit: no [de-identified] : 6/6/23 [de-identified] : Dr. Richardson [de-identified] : 7/13/23

## 2023-07-18 NOTE — REASON FOR VISIT
[FreeTextEntry2] : Patient feels no improvement in her Lower Back since her last visit. Patient experiences discomfort with certain movement. Patient continues with PT 2x a week. Patient is not in any pain.

## 2023-07-27 ENCOUNTER — TRANSCRIPTION ENCOUNTER (OUTPATIENT)
Age: 83
End: 2023-07-27

## 2023-07-28 ENCOUNTER — APPOINTMENT (OUTPATIENT)
Dept: INTERNAL MEDICINE | Facility: CLINIC | Age: 83
End: 2023-07-28
Payer: MEDICARE

## 2023-07-28 VITALS
RESPIRATION RATE: 16 BRPM | HEIGHT: 64 IN | SYSTOLIC BLOOD PRESSURE: 122 MMHG | TEMPERATURE: 97.8 F | OXYGEN SATURATION: 97 % | HEART RATE: 62 BPM | DIASTOLIC BLOOD PRESSURE: 70 MMHG

## 2023-07-28 DIAGNOSIS — H60.90 UNSPECIFIED OTITIS EXTERNA, UNSPECIFIED EAR: ICD-10-CM

## 2023-07-28 PROCEDURE — 99213 OFFICE O/P EST LOW 20 MIN: CPT

## 2023-07-28 NOTE — COUNSELING
[Good understanding] : Patient has a good understanding of disease, goals and obesity follow-up plan [FreeTextEntry2] :  low-sodium diet

## 2023-07-28 NOTE — DATA REVIEWED
Malou left message with patient regarding upcoming MP/PET test at 9:45AM on 8/2/22. No caffeine for 24 hrs (coffee, tea, soda, energy drinks like [employer], and decaf.) Eat or drink normally otherwise up to one hour prior to testing. Hold Metoprolol for 24hrs. Please take all other medications as prescribed and bring any inhalers you may have with you to your appointment. Location is [address] in [facility] at [facility]. Informed patient to arrive fifteen minutes prior to test. Testing will likely last 2-3 hours total time. Please call [phone] with questions.   [No studies available for review at this time.] : No studies available for review at this time.

## 2023-07-28 NOTE — HEALTH RISK ASSESSMENT
[No] : In the past 12 months have you used drugs other than those required for medical reasons? No [No falls in past year] : Patient reported no falls in the past year [0] : 1) Little interest or pleasure doing things: Not at all (0) [1] : 2) Feeling down, depressed, or hopeless for several days (1) [PHQ-2 Negative - No further assessment needed] : PHQ-2 Negative - No further assessment needed [Fully functional (bathing, dressing, toileting, transferring, walking, feeding)] : Fully functional (bathing, dressing, toileting, transferring, walking, feeding) [Fully functional (using the telephone, shopping, preparing meals, housekeeping, doing laundry, using] : Fully functional and needs no help or supervision to perform IADLs (using the telephone, shopping, preparing meals, housekeeping, doing laundry, using transportation, managing medications and managing finances) [Audit-CScore] : 0 [[id number]] : 1 [MammogramDate] : 03/22 [MammogramComments] : BI-RADS Category 2: Benign.  [BoneDensityDate] : 05/19 [BoneDensityComments] : Osteopenia.  [ColonoscopyDate] : 08/13 [ColonoscopyComments] : Sigmoid polyp and internal hemorrhoids.

## 2023-07-28 NOTE — ASSESSMENT
[FreeTextEntry1] : Ms. HARTMAN is a 82 year  female, with a past medical history as noted above ,who present to the office  today for  right ear pain

## 2023-07-28 NOTE — HISTORY OF PRESENT ILLNESS
[Earache (R)] : pain in right ear [Mild] : mild [___ Days ago] : [unfilled] days ago [Earache] : earache [Stable] : stable [Congestion] : no congestion [Cough] : no cough [Sore Throat] : no sore throat [Wheezing] : no wheezing [Chills] : no chills [Anorexia] : no anorexia [Shortness Of Breath] : no shortness of breath [Fatigue] : not fatigue [Headache] : no headache [Fever] : no fever [FreeTextEntry4] : Touching the inner ear  [FreeTextEntry8] : Patient denies any pain with chewing or swallowing.  Denies fever chills or night sweats.  Does have hearing loss which is chronic in nature.  Denies feeling dizzy or lightheaded.\par \par  states she has a follow-up with Dr. Saldana next week

## 2023-07-28 NOTE — PLAN
[FreeTextEntry1] : ENT  - otalgia  - Started of OE   -  advised patient keep ear clean and dry.  Avoid touching the ear.  Start    Ofloxacin otic eardrops..    Return to office next week for follow-up\par \par Cardiology\par hypertension - continue Valsartan 80 mg p.o.q.d. as directed; continue Metoprolol Tartrate 25 mg 1.5 tablets BID p.o.q.d. as directed - check CMP - continue low sodium diet; advised weight loss; will continue to monitor BP\par hypercholesterolemia - continue Atorvastatin Calcium 40 mg p.o.q.d. as directed - advised low cholesterol/low fat diet - \par \par vasc  pedal edema-advised low-sodium diet continue current medication regimen elevate legs skin.  Sitter compression stockings\par \par I spent 22 Minutes with the patient, half of which we discussed finding on physical exam  and coordinated care.  As well as reviewed my plans and follow ups.\par Dragon speech recognition software was used to create portions of this document.  An attempt at proofreading has been made to minimize errors please call if any questions arise.

## 2023-07-28 NOTE — PHYSICAL EXAM
[No Acute Distress] : no acute distress [Well Nourished] : well nourished [Well Developed] : well developed [Well-Appearing] : well-appearing [Normal Voice/Communication] : normal voice/communication [Normal Sclera/Conjunctiva] : normal sclera/conjunctiva [PERRL] : pupils equal round and reactive to light [EOMI] : extraocular movements intact [Normal Outer Ear/Nose] : the outer ears and nose were normal in appearance [Normal Oropharynx] : the oropharynx was normal [Normal TMs] : both tympanic membranes were normal [Normal Nasal Mucosa] : the nasal mucosa was normal [No JVD] : no jugular venous distention [No Lymphadenopathy] : no lymphadenopathy [Supple] : supple [Thyroid Normal, No Nodules] : the thyroid was normal and there were no nodules present [No Respiratory Distress] : no respiratory distress  [No Accessory Muscle Use] : no accessory muscle use [Clear to Auscultation] : lungs were clear to auscultation bilaterally [Normal Rate] : normal rate  [Regular Rhythm] : with a regular rhythm [Normal S1, S2] : normal S1 and S2 [No Murmur] : no murmur heard [No Carotid Bruits] : no carotid bruits [No Abdominal Bruit] : a ~M bruit was not heard ~T in the abdomen [No Varicosities] : no varicosities [Pedal Pulses Present] : the pedal pulses are present [No Palpable Aorta] : no palpable aorta [No Extremity Clubbing/Cyanosis] : no extremity clubbing/cyanosis [Soft] : abdomen soft [Non Tender] : non-tender [Non-distended] : non-distended [No Masses] : no abdominal mass palpated [No HSM] : no HSM [Normal Bowel Sounds] : normal bowel sounds [No CVA Tenderness] : no CVA  tenderness [No Spinal Tenderness] : no spinal tenderness [Kyphosis] : kyphosis [No Joint Swelling] : no joint swelling [Grossly Normal Strength/Tone] : grossly normal strength/tone [No Rash] : no rash [Coordination Grossly Intact] : coordination grossly intact [No Focal Deficits] : no focal deficits [Normal Gait] : normal gait [Deep Tendon Reflexes (DTR)] : deep tendon reflexes were 2+ and symmetric [Speech Grossly Normal] : speech grossly normal [Memory Grossly Normal] : memory grossly normal [Normal Affect] : the affect was normal [Alert and Oriented x3] : oriented to person, place, and time [Normal Mood] : the mood was normal [Normal Insight/Judgement] : insight and judgment were intact [Acne] : no acne [de-identified] :  right ear canal around -7 o'clock   area is inflamed tender to touch [de-identified] : trace edema in lower extremities bilaterally  [de-identified] : overweight

## 2023-08-01 ENCOUNTER — APPOINTMENT (OUTPATIENT)
Dept: INTERNAL MEDICINE | Facility: CLINIC | Age: 83
End: 2023-08-01
Payer: MEDICARE

## 2023-08-01 ENCOUNTER — NON-APPOINTMENT (OUTPATIENT)
Age: 83
End: 2023-08-01

## 2023-08-01 ENCOUNTER — LABORATORY RESULT (OUTPATIENT)
Age: 83
End: 2023-08-01

## 2023-08-01 VITALS
SYSTOLIC BLOOD PRESSURE: 124 MMHG | HEART RATE: 55 BPM | BODY MASS INDEX: 31.58 KG/M2 | DIASTOLIC BLOOD PRESSURE: 84 MMHG | HEIGHT: 64 IN | RESPIRATION RATE: 16 BRPM | WEIGHT: 185 LBS | TEMPERATURE: 97.6 F | OXYGEN SATURATION: 97 %

## 2023-08-01 DIAGNOSIS — Z00.00 ENCOUNTER FOR GENERAL ADULT MEDICAL EXAMINATION W/OUT ABNORMAL FINDINGS: ICD-10-CM

## 2023-08-01 PROCEDURE — 93000 ELECTROCARDIOGRAM COMPLETE: CPT

## 2023-08-01 PROCEDURE — 36415 COLL VENOUS BLD VENIPUNCTURE: CPT

## 2023-08-01 PROCEDURE — 99214 OFFICE O/P EST MOD 30 MIN: CPT | Mod: 25

## 2023-08-01 PROCEDURE — 99397 PER PM REEVAL EST PAT 65+ YR: CPT | Mod: 25,GY

## 2023-08-01 NOTE — ASSESSMENT
[FreeTextEntry1] : Patient is an 82 year old female with past medical history as below who presents for an AWV.

## 2023-08-01 NOTE — PLAN
[FreeTextEntry1] : Cardiology hypertension - continue Valsartan 80mg p.o.q.d. as directed; continue Metoprolol Tartrate 25mg 1.5 tablets BID p.o.q.d. as directed - check CMP - continue low sodium diet; advised weight loss; will continue to monitor BP hypercholesterolemia - continue Atorvastatin Calcium 40mg p.o.q.d. as directed - advised low cholesterol/low fat diet - check FLP and LFTs hypertriglyceridemia - continue Omega-3 Fish Oil 1000mg BID p.o.q.d. 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 Richardson, has RX for diclofenac, consider accupuncture and discussed OMM, continue with PT Endocrinology screening for osteoporosis-check DEXA scan (has RX)-discussed possible treatment options in detail hyperglycemia - advised low carbohydrate/low sugar diet; previously recommended gradually increasing CV exercise - check hemoglobin A1C obesity - advised low carbohydrate diet; previously recommended gradually increasing CV exercise  Continue Vitamin D-3 1000 IU p.o.q.d. with a meal as directed - check Vitamin D  Gastroenterology GERD - recommended trial off Omeprazole 20mg - discussed antireflux measures - advised against spicy food consumption - advised against tomato or tomato product consumption - advised against citrus fruit/juice consumption - advised against peppermint/chocolate consumption - advised against caffeinated/carbonated beverage consumption - advised smaller, more frequent meals - advised sitting upright for 3 hours after meals - if symptoms (heartburn/belching) are only noted 1-2x per week, recommended OTC Tums as needed; if symptoms are noted more frequently than 2x per week, recommended restarting Omeprazole  Dermatology/Oncology S/p excision of Merkel cell carcinoma; s/p nasolabial flap reconstruction and revision with takedown - follow up with surgeon, Dr. Mckeon and plastic surgeon, Dr. Owen as necessary  Neurology abnormal memory test (Neri Cognitive Assessment)- scored 23/30 which categorized her a mildly cognitive impaired; referral given for neurology consultation at [facility] ENT resolving right otitis media- - has been using Ofloxacin ear drops which have helped tremendously, use ad directed for two more days to complete a 7 day course   check CMP, FLP, hemoglobin A1C, Vitamin D, COVID-19 Mt Domain Antibody, and COVID-19 Nucleocapsid Antibody

## 2023-08-01 NOTE — PHYSICAL EXAM
[No Acute Distress] : no acute distress [Well Nourished] : well nourished [Well Developed] : well developed [Well-Appearing] : well-appearing [Normal Voice/Communication] : normal voice/communication [Normal Sclera/Conjunctiva] : normal sclera/conjunctiva [PERRL] : pupils equal round and reactive to light [EOMI] : extraocular movements intact [Normal Outer Ear/Nose] : the outer ears and nose were normal in appearance [Normal Oropharynx] : the oropharynx was normal [Normal TMs] : both tympanic membranes were normal [Normal Nasal Mucosa] : the nasal mucosa was normal [No JVD] : no jugular venous distention [No Lymphadenopathy] : no lymphadenopathy [Supple] : supple [Thyroid Normal, No Nodules] : the thyroid was normal and there were no nodules present [No Respiratory Distress] : no respiratory distress  [No Accessory Muscle Use] : no accessory muscle use [Clear to Auscultation] : lungs were clear to auscultation bilaterally [Normal Rate] : normal rate  [Regular Rhythm] : with a regular rhythm [Normal S1, S2] : normal S1 and S2 [No Murmur] : no murmur heard [No Carotid Bruits] : no carotid bruits [No Abdominal Bruit] : a ~M bruit was not heard ~T in the abdomen [No Varicosities] : no varicosities [Pedal Pulses Present] : the pedal pulses are present [No Edema] : there was no peripheral edema [No Palpable Aorta] : no palpable aorta [No Extremity Clubbing/Cyanosis] : no extremity clubbing/cyanosis [Soft] : abdomen soft [Non Tender] : non-tender [Non-distended] : non-distended [No Masses] : no abdominal mass palpated [No HSM] : no HSM [Normal Bowel Sounds] : normal bowel sounds [Normal Posterior Cervical Nodes] : no posterior cervical lymphadenopathy [Normal Anterior Cervical Nodes] : no anterior cervical lymphadenopathy [No CVA Tenderness] : no CVA  tenderness [No Spinal Tenderness] : no spinal tenderness [Kyphosis] : kyphosis [No Joint Swelling] : no joint swelling [Grossly Normal Strength/Tone] : grossly normal strength/tone [No Rash] : no rash [Coordination Grossly Intact] : coordination grossly intact [No Focal Deficits] : no focal deficits [Normal Gait] : normal gait [Deep Tendon Reflexes (DTR)] : deep tendon reflexes were 2+ and symmetric [Speech Grossly Normal] : speech grossly normal [Memory Grossly Normal] : memory grossly normal [Normal Affect] : the affect was normal [Alert and Oriented x3] : oriented to person, place, and time [Normal Mood] : the mood was normal [Normal Insight/Judgement] : insight and judgment were intact [Normal Supraclavicular Nodes] : no supraclavicular lymphadenopathy [Scoliosis] : no scoliosis [Acne] : no acne [de-identified] : trace hyperemia and bulging of right TM [de-identified] : trace edema in lower extremities bilaterally  [de-identified] : done by GYN [de-identified] : overweight [FreeTextEntry1] : done by GYN/GI

## 2023-08-01 NOTE — HISTORY OF PRESENT ILLNESS
[FreeTextEntry1] : BERTO [de-identified] : Patient is an 82 year old female with past medical history as below who presents for an AWV.   Patient notes she had an ear infection recently and was prescribed Ofloxacin ear drops, which significantly helped to alleviate her symptoms.  She is currently on day 5 of treatment and would like to know if she should continue the ear drops or stop the treatment.  She denies otorrhea, otalgia or fever..  The patient recently received the Neri Cognitive Assessment at [facility] and she scored a 23/30 which categorized her as having mild cognitive impairment. She notes having a good memory compared to others her age or younger but does note infrequently having difficulty remembering a name or finding a word while engaged in conversation.  She has no known history of dementia of the alzheimer's type.  Patient notes recent sudden back pain and had a full workup of her back pain.. Patient was diagnosed as having multiple lumbar disk bulges, degenerative disk disease and degenerative lumbar spondylosis by MRI lumbar spine.  She was prescribed Diclofenac and inquires about the potential of starting acupuncture treatment. Patient notes she has been going to PT but is not sure of the benefits just yet. Patient notes urinary frequency and urgency and was prescribed Myrbetriq by her GYN, . Patient was given samples of this medication but has not yet tried it.  She is up to date with seeing GYN (Dr. Chavarria), and has had mammogram and breast US in 2023 at [facility] which was reportedly WNL.  She is not UTD with DEXA and was given a prescription by her GYN.  She has received the shingrix vaccine series, TDAP, influenza vaccine but is not sure if she has received the pneumonia vaccine.  The patient is taking all medications as prescribed. She is taking atorvastatin 40 mg qd for hyperlipidemia and denies any new joint or muscle aches.  She is taking metoprolol and valsartan for hypertension and denies dizziness or lightheadedness. She also takes omeprazole for GERD and denies breakthrough pyrosis or belching.

## 2023-08-01 NOTE — PLAN
[FreeTextEntry1] : Cardiology hypertension - continue Valsartan 80mg p.o.q.d. as directed; continue Metoprolol Tartrate 25mg 1.5 tablets BID p.o.q.d. as directed - check CMP - continue low sodium diet; advised weight loss; will continue to monitor BP hypercholesterolemia - continue Atorvastatin Calcium 40mg p.o.q.d. as directed - advised low cholesterol/low fat diet - check FLP and LFTs hypertriglyceridemia - continue Omega-3 Fish Oil 1000mg BID p.o.q.d. 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 Richardson, has RX for diclofenac, consider accupuncture and discussed OMM, continue with PT Endocrinology screening for osteoporosis-check DEXA scan (has RX)-discussed possible treatment options in detail hyperglycemia - advised low carbohydrate/low sugar diet; previously recommended gradually increasing CV exercise - check hemoglobin A1C obesity - advised low carbohydrate diet; previously recommended gradually increasing CV exercise  Continue Vitamin D-3 1000 IU p.o.q.d. with a meal as directed - check Vitamin D  Gastroenterology GERD - recommended trial off Omeprazole 20mg - discussed antireflux measures - advised against spicy food consumption - advised against tomato or tomato product consumption - advised against citrus fruit/juice consumption - advised against peppermint/chocolate consumption - advised against caffeinated/carbonated beverage consumption - advised smaller, more frequent meals - advised sitting upright for 3 hours after meals - if symptoms (heartburn/belching) are only noted 1-2x per week, recommended OTC Tums as needed; if symptoms are noted more frequently than 2x per week, recommended restarting Omeprazole  Dermatology/Oncology S/p excision of Merkel cell carcinoma; s/p nasolabial flap reconstruction and revision with takedown - follow up with surgeon, Dr. Mckeon and plastic surgeon, Dr. Owen as necessary  Neurology abnormal memory test (Neri Cognitive Assessment)- scored 23/30 which categorized her a mildly cognitive impaired; referral given for neurology consultation at [facility] ENT resolving right otitis media- - has been using Ofloxacin ear drops which have helped tremendously, use ad directed for two more days to complete a 7 day course   check CMP, FLP, hemoglobin A1C, Vitamin D, COVID-19 Mt Domain Antibody, and COVID-19 Nucleocapsid Antibody

## 2023-08-01 NOTE — DATA REVIEWED
[No studies available for review at this time.] : No studies available for review at this time. [FreeTextEntry1] : EKG shows Sinus bradycardia at 55 BPM

## 2023-08-01 NOTE — REVIEW OF SYSTEMS
[Negative] : Heme/Lymph [Earache] : earache [Frequency] : frequency [Joint Pain] : joint pain [Joint Stiffness] : joint stiffness [Back Pain] : back pain [Memory Loss] : memory loss

## 2023-08-01 NOTE — HISTORY OF PRESENT ILLNESS
[FreeTextEntry1] : BERTO [de-identified] : Patient is an 82 year old female with past medical history as below who presents for an AWV.   Patient notes she had an ear infection recently and was prescribed Ofloxacin ear drops, which significantly helped to alleviate her symptoms.  She is currently on day 5 of treatment and would like to know if she should continue the ear drops or stop the treatment.  She denies otorrhea, otalgia or fever..  The patient recently received the Neri Cognitive Assessment at [facility] and she scored a 23/30 which categorized her as having mild cognitive impairment. She notes having a good memory compared to others her age or younger but does note infrequently having difficulty remembering a name or finding a word while engaged in conversation.  She has no known history of dementia of the alzheimer's type.  Patient notes recent sudden back pain and had a full workup of her back pain.. Patient was diagnosed as having multiple lumbar disk bulges, degenerative disk disease and degenerative lumbar spondylosis by MRI lumbar spine.  She was prescribed Diclofenac and inquires about the potential of starting acupuncture treatment. Patient notes she has been going to PT but is not sure of the benefits just yet. Patient notes urinary frequency and urgency and was prescribed Myrbetriq by her GYN, . Patient was given samples of this medication but has not yet tried it.  She is up to date with seeing GYN (Dr. Chavarria), and has had mammogram and breast US in 2023 at [facility] which was reportedly WNL.  She is not UTD with DEXA and was given a prescription by her GYN.  She has received the shingrix vaccine series, TDAP, influenza vaccine but is not sure if she has received the pneumonia vaccine.  The patient is taking all medications as prescribed. She is taking atorvastatin 40 mg qd for hyperlipidemia and denies any new joint or muscle aches.  She is taking metoprolol and valsartan for hypertension and denies dizziness or lightheadedness. She also takes omeprazole for GERD and denies breakthrough pyrosis or belching.

## 2023-08-01 NOTE — PHYSICAL EXAM
[No Acute Distress] : no acute distress [Well Nourished] : well nourished [Well Developed] : well developed [Well-Appearing] : well-appearing [Normal Voice/Communication] : normal voice/communication [Normal Sclera/Conjunctiva] : normal sclera/conjunctiva [PERRL] : pupils equal round and reactive to light [EOMI] : extraocular movements intact [Normal Outer Ear/Nose] : the outer ears and nose were normal in appearance [Normal Oropharynx] : the oropharynx was normal [Normal TMs] : both tympanic membranes were normal [Normal Nasal Mucosa] : the nasal mucosa was normal [No JVD] : no jugular venous distention [No Lymphadenopathy] : no lymphadenopathy [Supple] : supple [Thyroid Normal, No Nodules] : the thyroid was normal and there were no nodules present [No Respiratory Distress] : no respiratory distress  [No Accessory Muscle Use] : no accessory muscle use [Clear to Auscultation] : lungs were clear to auscultation bilaterally [Normal Rate] : normal rate  [Regular Rhythm] : with a regular rhythm [Normal S1, S2] : normal S1 and S2 [No Murmur] : no murmur heard [No Carotid Bruits] : no carotid bruits [No Abdominal Bruit] : a ~M bruit was not heard ~T in the abdomen [No Varicosities] : no varicosities [Pedal Pulses Present] : the pedal pulses are present [No Edema] : there was no peripheral edema [No Palpable Aorta] : no palpable aorta [No Extremity Clubbing/Cyanosis] : no extremity clubbing/cyanosis [Soft] : abdomen soft [Non Tender] : non-tender [Non-distended] : non-distended [No Masses] : no abdominal mass palpated [No HSM] : no HSM [Normal Bowel Sounds] : normal bowel sounds [Normal Posterior Cervical Nodes] : no posterior cervical lymphadenopathy [Normal Anterior Cervical Nodes] : no anterior cervical lymphadenopathy [No CVA Tenderness] : no CVA  tenderness [No Spinal Tenderness] : no spinal tenderness [Kyphosis] : kyphosis [No Joint Swelling] : no joint swelling [Grossly Normal Strength/Tone] : grossly normal strength/tone [No Rash] : no rash [Coordination Grossly Intact] : coordination grossly intact [No Focal Deficits] : no focal deficits [Normal Gait] : normal gait [Deep Tendon Reflexes (DTR)] : deep tendon reflexes were 2+ and symmetric [Speech Grossly Normal] : speech grossly normal [Memory Grossly Normal] : memory grossly normal [Normal Affect] : the affect was normal [Alert and Oriented x3] : oriented to person, place, and time [Normal Mood] : the mood was normal [Normal Insight/Judgement] : insight and judgment were intact [Normal Supraclavicular Nodes] : no supraclavicular lymphadenopathy [Scoliosis] : no scoliosis [Acne] : no acne [de-identified] : trace hyperemia and bulging of right TM [de-identified] : trace edema in lower extremities bilaterally  [de-identified] : done by GYN [de-identified] : overweight [FreeTextEntry1] : done by GYN/GI

## 2023-08-03 ENCOUNTER — TRANSCRIPTION ENCOUNTER (OUTPATIENT)
Age: 83
End: 2023-08-03

## 2023-08-08 LAB — TSH SERPL-ACNC: 2 UIU/ML

## 2023-08-16 DIAGNOSIS — N39.0 URINARY TRACT INFECTION, SITE NOT SPECIFIED: ICD-10-CM

## 2023-08-17 ENCOUNTER — TRANSCRIPTION ENCOUNTER (OUTPATIENT)
Age: 83
End: 2023-08-17

## 2023-08-17 LAB
25(OH)D3 SERPL-MCNC: 34.1 NG/ML
ALBUMIN SERPL ELPH-MCNC: 4.4 G/DL
ALP BLD-CCNC: 97 U/L
ALT SERPL-CCNC: 22 U/L
ANION GAP SERPL CALC-SCNC: 13 MMOL/L
APPEARANCE: ABNORMAL
AST SERPL-CCNC: 22 U/L
BILIRUB SERPL-MCNC: 0.6 MG/DL
BILIRUBIN URINE: NEGATIVE
BLOOD URINE: ABNORMAL
BUN SERPL-MCNC: 20 MG/DL
CALCIUM SERPL-MCNC: 9.2 MG/DL
CHLORIDE SERPL-SCNC: 103 MMOL/L
CHOLEST SERPL-MCNC: 173 MG/DL
CO2 SERPL-SCNC: 26 MMOL/L
COLOR: YELLOW
CREAT SERPL-MCNC: 0.86 MG/DL
EGFR: 68 ML/MIN/1.73M2
ESTIMATED AVERAGE GLUCOSE: 123 MG/DL
GLUCOSE QUALITATIVE U: NEGATIVE MG/DL
GLUCOSE SERPL-MCNC: 100 MG/DL
HBA1C MFR BLD HPLC: 5.9 %
HDLC SERPL-MCNC: 42 MG/DL
KETONES URINE: NEGATIVE MG/DL
LDLC SERPL CALC-MCNC: 97 MG/DL
LEUKOCYTE ESTERASE URINE: ABNORMAL
NITRITE URINE: NEGATIVE
NONHDLC SERPL-MCNC: 131 MG/DL
PH URINE: 6
POTASSIUM SERPL-SCNC: 4.2 MMOL/L
PROT SERPL-MCNC: 7.3 G/DL
PROTEIN URINE: 100 MG/DL
SODIUM SERPL-SCNC: 141 MMOL/L
SPECIFIC GRAVITY URINE: 1.01
TRIGL SERPL-MCNC: 196 MG/DL
UROBILINOGEN URINE: 0.2 MG/DL

## 2023-08-23 ENCOUNTER — RESULT CHARGE (OUTPATIENT)
Age: 83
End: 2023-08-23

## 2023-08-23 ENCOUNTER — APPOINTMENT (OUTPATIENT)
Dept: UROLOGY | Facility: CLINIC | Age: 83
End: 2023-08-23
Payer: MEDICARE

## 2023-08-23 VITALS
WEIGHT: 185 LBS | RESPIRATION RATE: 16 BRPM | HEIGHT: 64 IN | DIASTOLIC BLOOD PRESSURE: 88 MMHG | BODY MASS INDEX: 31.58 KG/M2 | HEART RATE: 56 BPM | OXYGEN SATURATION: 98 % | SYSTOLIC BLOOD PRESSURE: 172 MMHG

## 2023-08-23 PROCEDURE — 51701 INSERT BLADDER CATHETER: CPT

## 2023-08-23 PROCEDURE — 99204 OFFICE O/P NEW MOD 45 MIN: CPT | Mod: 25

## 2023-08-23 RX ORDER — COVID-19 ANTIGEN TEST
KIT MISCELLANEOUS
Qty: 8 | Refills: 0 | Status: ACTIVE | COMMUNITY
Start: 2023-04-26

## 2023-08-23 RX ORDER — ESTRADIOL 0.1 MG/G
0.1 CREAM VAGINAL
Qty: 1 | Refills: 3 | Status: ACTIVE | COMMUNITY
Start: 2023-08-23 | End: 1900-01-01

## 2023-08-24 LAB
APPEARANCE: CLEAR
BACTERIA: NEGATIVE /HPF
BILIRUBIN URINE: NEGATIVE
BLOOD URINE: ABNORMAL
CAST: 0 /LPF
COLOR: YELLOW
EPITHELIAL CELLS: 0 /HPF
GLUCOSE QUALITATIVE U: NEGATIVE MG/DL
KETONES URINE: NEGATIVE MG/DL
LEUKOCYTE ESTERASE URINE: ABNORMAL
MICROSCOPIC-UA: NORMAL
NITRITE URINE: NEGATIVE
PH URINE: 6.5
PROTEIN URINE: NORMAL MG/DL
RED BLOOD CELLS URINE: 0 /HPF
SPECIFIC GRAVITY URINE: 1.01
UROBILINOGEN URINE: 0.2 MG/DL
WHITE BLOOD CELLS URINE: 153 /HPF

## 2023-08-30 ENCOUNTER — NON-APPOINTMENT (OUTPATIENT)
Age: 83
End: 2023-08-30

## 2023-09-05 ENCOUNTER — APPOINTMENT (OUTPATIENT)
Dept: ORTHOPEDIC SURGERY | Facility: CLINIC | Age: 83
End: 2023-09-05
Payer: MEDICARE

## 2023-09-05 VITALS — HEIGHT: 64 IN | BODY MASS INDEX: 31.58 KG/M2 | WEIGHT: 185 LBS

## 2023-09-05 LAB — BACTERIA UR CULT: ABNORMAL

## 2023-09-05 PROCEDURE — 99213 OFFICE O/P EST LOW 20 MIN: CPT

## 2023-09-05 RX ORDER — OFLOXACIN OTIC 3 MG/ML
0.3 SOLUTION AURICULAR (OTIC) TWICE DAILY
Qty: 2 | Refills: 0 | Status: COMPLETED | COMMUNITY
Start: 2023-07-28 | End: 2023-09-05

## 2023-09-05 RX ORDER — SOLIFENACIN SUCCINATE 5 MG/1
5 TABLET ORAL
Qty: 30 | Refills: 5 | Status: COMPLETED | COMMUNITY
Start: 2023-08-25 | End: 2023-09-05

## 2023-09-05 NOTE — HISTORY OF PRESENT ILLNESS
[Lower back] : lower back [Gradual] : gradual [2] : 2 [Localized] : localized [Intermittent] : intermittent [Leisure] : leisure [Rest] : rest [Physical therapy] : physical therapy [Exercising] : exercising [Retired] : Work status: retired [] : Post Surgical Visit: no [de-identified] : 7/18/2023 [de-identified] : Dr. Richardson

## 2023-09-05 NOTE — REASON FOR VISIT
[FreeTextEntry2] : Patient feels improvement L Back since last visit. She continues with PT which she finds helpful. She would like an updated prescription to continue PT.

## 2023-09-06 ENCOUNTER — TRANSCRIPTION ENCOUNTER (OUTPATIENT)
Age: 83
End: 2023-09-06

## 2023-09-11 ENCOUNTER — TRANSCRIPTION ENCOUNTER (OUTPATIENT)
Age: 83
End: 2023-09-11

## 2023-09-13 ENCOUNTER — RX RENEWAL (OUTPATIENT)
Age: 83
End: 2023-09-13

## 2023-09-25 DIAGNOSIS — Z23 ENCOUNTER FOR IMMUNIZATION: ICD-10-CM

## 2023-10-06 ENCOUNTER — RX RENEWAL (OUTPATIENT)
Age: 83
End: 2023-10-06

## 2023-11-27 ENCOUNTER — APPOINTMENT (OUTPATIENT)
Dept: UROLOGY | Facility: CLINIC | Age: 83
End: 2023-11-27
Payer: MEDICARE

## 2023-11-27 VITALS
DIASTOLIC BLOOD PRESSURE: 83 MMHG | HEART RATE: 60 BPM | SYSTOLIC BLOOD PRESSURE: 136 MMHG | WEIGHT: 185 LBS | OXYGEN SATURATION: 96 % | BODY MASS INDEX: 31.58 KG/M2 | HEIGHT: 64 IN

## 2023-11-27 DIAGNOSIS — N95.2 POSTMENOPAUSAL ATROPHIC VAGINITIS: ICD-10-CM

## 2023-11-27 PROCEDURE — 99214 OFFICE O/P EST MOD 30 MIN: CPT

## 2023-11-27 RX ORDER — MIRABEGRON 25 MG/1
25 TABLET, FILM COATED, EXTENDED RELEASE ORAL
Qty: 14 | Refills: 0 | Status: DISCONTINUED | COMMUNITY
Start: 2023-08-23 | End: 2023-11-27

## 2023-12-02 ENCOUNTER — RX RENEWAL (OUTPATIENT)
Age: 83
End: 2023-12-02

## 2023-12-27 ENCOUNTER — APPOINTMENT (OUTPATIENT)
Dept: UROLOGY | Facility: CLINIC | Age: 83
End: 2023-12-27
Payer: MEDICARE

## 2023-12-27 VITALS
OXYGEN SATURATION: 97 % | SYSTOLIC BLOOD PRESSURE: 144 MMHG | BODY MASS INDEX: 31.58 KG/M2 | DIASTOLIC BLOOD PRESSURE: 78 MMHG | WEIGHT: 185 LBS | HEIGHT: 64 IN | HEART RATE: 60 BPM

## 2023-12-27 DIAGNOSIS — R39.15 URGENCY OF URINATION: ICD-10-CM

## 2023-12-27 LAB
BILIRUB UR QL STRIP: NEGATIVE
CLARITY UR: CLEAR
COLLECTION METHOD: NORMAL
GLUCOSE UR-MCNC: NEGATIVE
HCG UR QL: 0.2 EU/DL
HGB UR QL STRIP.AUTO: NEGATIVE
KETONES UR-MCNC: NEGATIVE
LEUKOCYTE ESTERASE UR QL STRIP: NEGATIVE
NITRITE UR QL STRIP: NEGATIVE
PH UR STRIP: 7
PROT UR STRIP-MCNC: NEGATIVE
SP GR UR STRIP: 1.01

## 2023-12-27 PROCEDURE — 57160 INSERT PESSARY/OTHER DEVICE: CPT

## 2023-12-27 PROCEDURE — 99213 OFFICE O/P EST LOW 20 MIN: CPT | Mod: 25

## 2023-12-27 PROCEDURE — A4562: CPT

## 2023-12-27 NOTE — ASSESSMENT
[FreeTextEntry1] : 82 y/o female with stage 2 anterior wall prolapse. Pessary #4 fitted. Voiding trial successful voided 80 ml after instilled @ 100 ml  Ua negative Continue  Solfienacin 5 mg. follow up 8 week pelvic and pessary exam.

## 2023-12-27 NOTE — HISTORY OF PRESENT ILLNESS
[None] : None [FreeTextEntry1] : ED HARTMAN  83 year F presents for pessary fitting for stage 3 Anterior wall prolapse.  Patient with OAB doing well on  Solfienacin 5 mg. Patient with  mild constipation and dry mouth. Feels urine has abnormal color green- yellow. Using Estradiol 1 gm twice weekly.   [Urinary Incontinence] : no urinary incontinence [Urinary Retention] : no urinary retention

## 2023-12-27 NOTE — PHYSICAL EXAM
[Normal Appearance] : normal appearance [Well Groomed] : well groomed [General Appearance - In No Acute Distress] : no acute distress [Edema] : no peripheral edema [Respiration, Rhythm And Depth] : normal respiratory rhythm and effort [Exaggerated Use Of Accessory Muscles For Inspiration] : no accessory muscle use [Abdomen Soft] : soft [Abdomen Tenderness] : non-tender [Costovertebral Angle Tenderness] : no ~M costovertebral angle tenderness [Urinary Bladder Findings] : the bladder was normal on palpation [Normal Station and Gait] : the gait and station were normal for the patient's age [] : no rash [No Focal Deficits] : no focal deficits [Oriented To Time, Place, And Person] : oriented to person, place, and time [Affect] : the affect was normal [Mood] : the mood was normal [No Palpable Adenopathy] : no palpable adenopathy [Urethral Meatus] : normal urethra [FreeTextEntry1] : Stage 2 prolapse anterior bladder wall.  Erythema. introitus. tone 3/5. POP Q  Aa  0         Ba  0         C -6  gH  4          pB 3         TVL 10  Ap         Bp          D   Patient with stage 2 anterior wall prolapse. Pessary #4 ring with support fitted well. No discomfort.

## 2024-01-12 ENCOUNTER — TRANSCRIPTION ENCOUNTER (OUTPATIENT)
Age: 84
End: 2024-01-12

## 2024-01-17 ENCOUNTER — APPOINTMENT (OUTPATIENT)
Dept: UROLOGY | Facility: CLINIC | Age: 84
End: 2024-01-17
Payer: MEDICARE

## 2024-01-17 PROCEDURE — 99213 OFFICE O/P EST LOW 20 MIN: CPT

## 2024-01-17 RX ORDER — MIRABEGRON 25 MG/1
25 TABLET, FILM COATED, EXTENDED RELEASE ORAL
Qty: 30 | Refills: 2 | Status: DISCONTINUED | COMMUNITY
Start: 2023-08-23 | End: 2024-01-17

## 2024-01-17 NOTE — HISTORY OF PRESENT ILLNESS
[FreeTextEntry1] : ED HARTMAN  83 year F presents for evaluation of displacement of pessary.  Patient feels she has palpated a bulge.  Denies any symptoms of the pessary being in place denies any vaginal discharge or hematuria. Continues on Solifenacin for overactive bladder. 12/27/23 note ED HARTMAN  83 year F presents for pessary fitting for stage 3 Anterior wall prolapse.  Patient with OAB doing well on  Solfienacin 5 mg. Patient with  mild constipation and dry mouth. Feels urine has abnormal color green- yellow. Using Estradiol 1 gm twice weekly.   [Urinary Incontinence] : no urinary incontinence [Urinary Retention] : no urinary retention [None] : None

## 2024-01-17 NOTE — ASSESSMENT
[FreeTextEntry1] : 82 y/o female with stage 2 anterior wall prolapse, posterior vaginal wall prolapse. Pessary #4 fitted.  In good position.  Continue  Solfienacin 5 mg. follow up 6 week pelvic and pessary exam.   Reassured patient that on occasion the rectum can protrude through the vagina and that is what she is likely palpating as the pessary may not support this area.  Advised to continue good bowel regimen and avoid constipation.

## 2024-01-17 NOTE — PHYSICAL EXAM
[Normal Appearance] : normal appearance [Well Groomed] : well groomed [General Appearance - In No Acute Distress] : no acute distress [Edema] : no peripheral edema [Respiration, Rhythm And Depth] : normal respiratory rhythm and effort [Exaggerated Use Of Accessory Muscles For Inspiration] : no accessory muscle use [Abdomen Soft] : soft [Abdomen Tenderness] : non-tender [Costovertebral Angle Tenderness] : no ~M costovertebral angle tenderness [Urinary Bladder Findings] : the bladder was normal on palpation [Normal Station and Gait] : the gait and station were normal for the patient's age [] : no rash [No Focal Deficits] : no focal deficits [Oriented To Time, Place, And Person] : oriented to person, place, and time [Affect] : the affect was normal [Mood] : the mood was normal [No Palpable Adenopathy] : no palpable adenopathy [Urethral Meatus] : normal urethra [FreeTextEntry1] : Stage 2 prolapse anterior bladder wall.  Erythema. introitus. tone 3/5. POP Q  Aa  0         Ba  0         C -6  gH  4          pB 3         TVL 10  Ap         Bp          D   Patient with stage 2 anterior wall prolapse. Pessary #4 ring with support fitted well. No discomfort.   No noted dislodgement of pessary, No bleeding. Pessary removed and inspected. Noted rectocele

## 2024-02-08 ENCOUNTER — TRANSCRIPTION ENCOUNTER (OUTPATIENT)
Age: 84
End: 2024-02-08

## 2024-02-28 ENCOUNTER — APPOINTMENT (OUTPATIENT)
Dept: UROLOGY | Facility: CLINIC | Age: 84
End: 2024-02-28
Payer: MEDICARE

## 2024-02-28 ENCOUNTER — TRANSCRIPTION ENCOUNTER (OUTPATIENT)
Age: 84
End: 2024-02-28

## 2024-02-28 VITALS
HEIGHT: 64 IN | HEART RATE: 60 BPM | OXYGEN SATURATION: 96 % | BODY MASS INDEX: 31.76 KG/M2 | RESPIRATION RATE: 17 BRPM | DIASTOLIC BLOOD PRESSURE: 78 MMHG | SYSTOLIC BLOOD PRESSURE: 154 MMHG | WEIGHT: 186 LBS

## 2024-02-28 DIAGNOSIS — R39.15 URGENCY OF URINATION: ICD-10-CM

## 2024-02-28 DIAGNOSIS — R35.0 FREQUENCY OF MICTURITION: ICD-10-CM

## 2024-02-28 DIAGNOSIS — N81.6 RECTOCELE: ICD-10-CM

## 2024-02-28 PROCEDURE — 99215 OFFICE O/P EST HI 40 MIN: CPT

## 2024-02-28 PROCEDURE — 51798 US URINE CAPACITY MEASURE: CPT

## 2024-02-29 PROBLEM — N81.6 RECTOCELE, FEMALE: Status: ACTIVE | Noted: 2024-01-17

## 2024-02-29 PROBLEM — R35.0 URINARY FREQUENCY: Status: ACTIVE | Noted: 2021-03-24

## 2024-02-29 NOTE — ASSESSMENT
[FreeTextEntry1] : 84 y/o with OAB. anterior wall prolapse with incomplete bladder emptying. Continue solfiencacin 5 mg nighlty Straight cath  ml clear urine. Pessary removed cleansed and replaced.   follow up 3 months pessary maintenance visit. Stop Solifenacin 4 days before next visit for PVR.

## 2024-02-29 NOTE — HISTORY OF PRESENT ILLNESS
[FreeTextEntry1] : ED HARTMAN  83 year F presents for follow up. Doing well on Solifenecin 5mg Newly placed @4ring with support pessary. Denies any issues, vaginal discharge or vaginal bleeding.    [Urinary Retention] : no urinary retention [Urinary Incontinence] : no urinary incontinence [Urinary Urgency] : no urinary urgency [Urinary Frequency] : no urinary frequency

## 2024-02-29 NOTE — PHYSICAL EXAM
[Normal Appearance] : normal appearance [Well Groomed] : well groomed [General Appearance - In No Acute Distress] : no acute distress [Edema] : no peripheral edema [Respiration, Rhythm And Depth] : normal respiratory rhythm and effort [Exaggerated Use Of Accessory Muscles For Inspiration] : no accessory muscle use [Abdomen Soft] : soft [Abdomen Tenderness] : non-tender [Costovertebral Angle Tenderness] : no ~M costovertebral angle tenderness [Urinary Bladder Findings] : the bladder was normal on palpation [Normal Station and Gait] : the gait and station were normal for the patient's age [] : no rash [No Focal Deficits] : no focal deficits [Affect] : the affect was normal [Oriented To Time, Place, And Person] : oriented to person, place, and time [Mood] : the mood was normal [No Palpable Adenopathy] : no palpable adenopathy [de-identified] : Pessary #4 removed. Vagina examined. No ulcers POP stage 2

## 2024-04-04 ENCOUNTER — APPOINTMENT (OUTPATIENT)
Dept: INTERNAL MEDICINE | Facility: CLINIC | Age: 84
End: 2024-04-04

## 2024-04-09 ENCOUNTER — APPOINTMENT (OUTPATIENT)
Dept: ORTHOPEDIC SURGERY | Facility: CLINIC | Age: 84
End: 2024-04-09
Payer: MEDICARE

## 2024-04-09 VITALS — BODY MASS INDEX: 31.76 KG/M2 | HEIGHT: 64 IN | WEIGHT: 186 LBS

## 2024-04-09 DIAGNOSIS — M41.9 SCOLIOSIS, UNSPECIFIED: ICD-10-CM

## 2024-04-09 PROCEDURE — 99213 OFFICE O/P EST LOW 20 MIN: CPT

## 2024-04-09 PROCEDURE — 72100 X-RAY EXAM L-S SPINE 2/3 VWS: CPT

## 2024-04-09 PROCEDURE — 73502 X-RAY EXAM HIP UNI 2-3 VIEWS: CPT

## 2024-04-09 NOTE — HISTORY OF PRESENT ILLNESS
[Lower back] : lower back [Gradual] : gradual [2] : 2 [Localized] : localized [Intermittent] : intermittent [Leisure] : leisure [Rest] : rest [Physical therapy] : physical therapy [Exercising] : exercising [Retired] : Work status: retired [] : Post Surgical Visit: no [de-identified] : 7/18/2023 [de-identified] : Dr. Richardson

## 2024-04-09 NOTE — ASSESSMENT
[FreeTextEntry1] : PT helps, so will prescribe it. Had MRI lumbar spine last year, results reviewed again.

## 2024-04-09 NOTE — REASON FOR VISIT
[FreeTextEntry2] : Patient has some increased pain L Back since last visit. She continues with PT which she finds helpful. She would like an updated prescription to continue PT.

## 2024-04-09 NOTE — PHYSICAL EXAM
[Extension] : extension [] : non-antalgic [Facet arthropathy] : Facet arthropathy [Disc space narrowing] : Disc space narrowing [Scoliosis] : Scoliosis [AP] : anteroposterior [Lateral] : lateral [There are no fractures, subluxations or dislocations. No significant abnormalities are seen] : There are no fractures, subluxations or dislocations. No significant abnormalities are seen [Moderate arthritis (Tonnis Grade 2)] : Moderate arthritis (Tonnis Grade 2)

## 2024-05-08 ENCOUNTER — APPOINTMENT (OUTPATIENT)
Dept: ORTHOPEDIC SURGERY | Facility: CLINIC | Age: 84
End: 2024-05-08

## 2024-05-21 ENCOUNTER — RX RENEWAL (OUTPATIENT)
Age: 84
End: 2024-05-21

## 2024-05-29 ENCOUNTER — APPOINTMENT (OUTPATIENT)
Dept: ORTHOPEDIC SURGERY | Facility: CLINIC | Age: 84
End: 2024-05-29
Payer: MEDICARE

## 2024-05-29 VITALS — BODY MASS INDEX: 31.76 KG/M2 | WEIGHT: 186 LBS | HEIGHT: 64 IN

## 2024-05-29 DIAGNOSIS — M47.817 SPONDYLOSIS W/OUT MYELOPATHY OR RADICULOPATHY, LUMBOSACRAL REGION: ICD-10-CM

## 2024-05-29 DIAGNOSIS — M51.36 OTHER INTERVERTEBRAL DISC DEGENERATION, LUMBAR REGION: ICD-10-CM

## 2024-05-29 PROCEDURE — 99213 OFFICE O/P EST LOW 20 MIN: CPT

## 2024-05-29 NOTE — PHYSICAL EXAM
[Extension] : extension [Facet arthropathy] : Facet arthropathy [Disc space narrowing] : Disc space narrowing [Scoliosis] : Scoliosis [AP] : anteroposterior [Lateral] : lateral [There are no fractures, subluxations or dislocations. No significant abnormalities are seen] : There are no fractures, subluxations or dislocations. No significant abnormalities are seen [Moderate arthritis (Tonnis Grade 2)] : Moderate arthritis (Tonnis Grade 2) [] : non-antalgic

## 2024-05-29 NOTE — HISTORY OF PRESENT ILLNESS
[Lower back] : lower back [Gradual] : gradual [2] : 2 [Localized] : localized [Intermittent] : intermittent [Leisure] : leisure [Rest] : rest [Physical therapy] : physical therapy [Exercising] : exercising [Retired] : Work status: retired [] : Post Surgical Visit: no [de-identified] : 4/09/24 [de-identified] : Dr. Richardson

## 2024-05-29 NOTE — ASSESSMENT
[FreeTextEntry1] : if symptoms not improving discussed referral to PM and spine specialist. Wants to continue with PT. Recommend heat and ice and ointments.

## 2024-05-29 NOTE — REASON FOR VISIT
[FreeTextEntry2] : Patient has some increased pain L Back since last visit. She is continuing with physical therapy.

## 2024-06-12 ENCOUNTER — APPOINTMENT (OUTPATIENT)
Dept: INTERNAL MEDICINE | Facility: CLINIC | Age: 84
End: 2024-06-12
Payer: MEDICARE

## 2024-06-12 VITALS — SYSTOLIC BLOOD PRESSURE: 136 MMHG | DIASTOLIC BLOOD PRESSURE: 80 MMHG

## 2024-06-12 VITALS
WEIGHT: 185.13 LBS | BODY MASS INDEX: 31.6 KG/M2 | TEMPERATURE: 98 F | RESPIRATION RATE: 17 BRPM | HEIGHT: 64 IN | OXYGEN SATURATION: 96 % | HEART RATE: 59 BPM | SYSTOLIC BLOOD PRESSURE: 152 MMHG | DIASTOLIC BLOOD PRESSURE: 80 MMHG

## 2024-06-12 DIAGNOSIS — R26.81 UNSTEADINESS ON FEET: ICD-10-CM

## 2024-06-12 DIAGNOSIS — I10 ESSENTIAL (PRIMARY) HYPERTENSION: ICD-10-CM

## 2024-06-12 DIAGNOSIS — E78.00 PURE HYPERCHOLESTEROLEMIA, UNSPECIFIED: ICD-10-CM

## 2024-06-12 DIAGNOSIS — K21.9 GASTRO-ESOPHAGEAL REFLUX DISEASE W/OUT ESOPHAGITIS: ICD-10-CM

## 2024-06-12 DIAGNOSIS — E55.9 VITAMIN D DEFICIENCY, UNSPECIFIED: ICD-10-CM

## 2024-06-12 DIAGNOSIS — E66.9 OBESITY, UNSPECIFIED: ICD-10-CM

## 2024-06-12 DIAGNOSIS — Z79.899 OTHER LONG TERM (CURRENT) DRUG THERAPY: ICD-10-CM

## 2024-06-12 PROCEDURE — 36415 COLL VENOUS BLD VENIPUNCTURE: CPT

## 2024-06-12 PROCEDURE — 99214 OFFICE O/P EST MOD 30 MIN: CPT

## 2024-06-12 PROCEDURE — G2211 COMPLEX E/M VISIT ADD ON: CPT

## 2024-06-12 RX ORDER — METOPROLOL TARTRATE 25 MG/1
25 TABLET, FILM COATED ORAL
Qty: 270 | Refills: 1 | Status: ACTIVE | COMMUNITY
Start: 2023-06-18 | End: 1900-01-01

## 2024-06-12 RX ORDER — VALSARTAN 80 MG/1
80 TABLET, COATED ORAL
Qty: 90 | Refills: 1 | Status: ACTIVE | COMMUNITY
Start: 2021-10-26 | End: 1900-01-01

## 2024-06-12 RX ORDER — ATORVASTATIN CALCIUM 40 MG/1
40 TABLET, FILM COATED ORAL
Qty: 90 | Refills: 1 | Status: ACTIVE | COMMUNITY
Start: 2019-09-11 | End: 1900-01-01

## 2024-06-12 RX ORDER — SULFAMETHOXAZOLE AND TRIMETHOPRIM 800; 160 MG/1; MG/1
800-160 TABLET ORAL
Qty: 14 | Refills: 0 | Status: DISCONTINUED | COMMUNITY
Start: 2023-08-16 | End: 2024-06-12

## 2024-06-12 NOTE — PLAN
[FreeTextEntry1] : Cardiology hypertension - continue Valsartan 80mg p.o.q.d. as directed; continue Metoprolol Tartrate 25mg 1.5 tablets BID p.o.q.d. as directed - check CMP - continue low sodium diet; advised weight loss; will continue to monitor BP hypercholesterolemia - continue Atorvastatin Calcium 40mg p.o.q.d. as directed - advised low cholesterol/low fat diet - check FLP and LFTs hypertriglyceridemia - continue Omega-3 Fish Oil 1000mg BID p.o.q.d. 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 Richardson, has RX for diclofenac, continue with PT Endocrinology screening for osteoporosis-check DEXA scan (has RX)-discussed possible treatment options in detail hyperglycemia - advised low carbohydrate/low sugar diet; previously recommended gradually increasing CV exercise - check hemoglobin A1C obesity - advised low carbohydrate diet; previously recommended gradually increasing CV exercise  Continue Vitamin D-3 1000 IU p.o.q.d. with a meal as directed - check Vitamin D  Gastroenterology GERD - c/w Omeprazole 20mg - discussed antireflux measures - advised against spicy food consumption - advised against tomato or tomato product consumption - advised against citrus fruit/juice consumption - advised against peppermint/chocolate consumption - advised against caffeinated/carbonated beverage consumption - advised smaller, more frequent meals - advised sitting upright for 3 hours after meals Dermatology/Oncology S/p excision of Merkel cell carcinoma; s/p nasolabial flap reconstruction and revision with takedown - follow up with surgeon, Dr. Mckeon and plastic surgeon, Dr. 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

## 2024-06-12 NOTE — REVIEW OF SYSTEMS
[Nocturia] : nocturia [Frequency] : frequency [Joint Pain] : joint pain [Back Pain] : back pain [Unsteady Walking] : ataxia [Negative] : Heme/Lymph

## 2024-06-12 NOTE — HISTORY OF PRESENT ILLNESS
[FreeTextEntry1] : fasting blood work BP check RX refills [de-identified] : The patient is an 83 year old female PMH as below who presents for fasting blood work, RX refills and BP check.  She is taking all medications as prescribed.  She denies diffuse muscle aches or arthralgias on atorvastatin.  She denies dizziness/lightheadedness on metoprolol and valsartan.  She denies significant breakthrough pyrosis or belching on omeprazole.  She notes gait instability and difficulty walking up stairs.  She will be moving to [place] in the next few months as a result of wanting to live in a home without stairs as well as a community where additional services are provided.  She has recently been seeing orthopedist, Dr. Richardson for her orthopedic issues and physical therapy to aid in gait stability and reduce the chance of falls. Her weight is stable.

## 2024-06-12 NOTE — PHYSICAL EXAM
[No Acute Distress] : no acute distress [Well Nourished] : well nourished [Well Developed] : well developed [Well-Appearing] : well-appearing [Normal Voice/Communication] : normal voice/communication [Normal Sclera/Conjunctiva] : normal sclera/conjunctiva [PERRL] : pupils equal round and reactive to light [EOMI] : extraocular movements intact [Normal Outer Ear/Nose] : the outer ears and nose were normal in appearance [Normal Oropharynx] : the oropharynx was normal [Normal TMs] : both tympanic membranes were normal [Normal Nasal Mucosa] : the nasal mucosa was normal [No JVD] : no jugular venous distention [No Lymphadenopathy] : no lymphadenopathy [Supple] : supple [Thyroid Normal, No Nodules] : the thyroid was normal and there were no nodules present [No Respiratory Distress] : no respiratory distress  [No Accessory Muscle Use] : no accessory muscle use [Clear to Auscultation] : lungs were clear to auscultation bilaterally [Normal Rate] : normal rate  [Regular Rhythm] : with a regular rhythm [Normal S1, S2] : normal S1 and S2 [No Murmur] : no murmur heard [No Carotid Bruits] : no carotid bruits [No Abdominal Bruit] : a ~M bruit was not heard ~T in the abdomen [Pedal Pulses Present] : the pedal pulses are present [No Palpable Aorta] : no palpable aorta [No Extremity Clubbing/Cyanosis] : no extremity clubbing/cyanosis [Soft] : abdomen soft [Non Tender] : non-tender [Non-distended] : non-distended [No Masses] : no abdominal mass palpated [No HSM] : no HSM [Normal Bowel Sounds] : normal bowel sounds [Normal Supraclavicular Nodes] : no supraclavicular lymphadenopathy [Normal Posterior Cervical Nodes] : no posterior cervical lymphadenopathy [Normal Anterior Cervical Nodes] : no anterior cervical lymphadenopathy [No CVA Tenderness] : no CVA  tenderness [No Spinal Tenderness] : no spinal tenderness [Kyphosis] : kyphosis [Scoliosis] : no scoliosis [No Joint Swelling] : no joint swelling [Grossly Normal Strength/Tone] : grossly normal strength/tone [No Rash] : no rash [Acne] : no acne [Coordination Grossly Intact] : coordination grossly intact [No Focal Deficits] : no focal deficits [Normal Gait] : normal gait [Deep Tendon Reflexes (DTR)] : deep tendon reflexes were 2+ and symmetric [Speech Grossly Normal] : speech grossly normal [Memory Grossly Normal] : memory grossly normal [Normal Affect] : the affect was normal [Alert and Oriented x3] : oriented to person, place, and time [Normal Mood] : the mood was normal [Normal Insight/Judgement] : insight and judgment were intact [de-identified] : trace edema in lower extremities bilaterally  [de-identified] : done by GYN [FreeTextEntry1] : done by GYN/GI [de-identified] : overweight

## 2024-06-12 NOTE — ASSESSMENT
[FreeTextEntry1] : 83 year old female PMH as above presents for surveillance blood work, follow up of hypertension and general follow up of chronic medical conditions.

## 2024-06-16 LAB
ALBUMIN SERPL ELPH-MCNC: 4.3 G/DL
ALP BLD-CCNC: 83 U/L
ALT SERPL-CCNC: 20 U/L
ANION GAP SERPL CALC-SCNC: 11 MMOL/L
AST SERPL-CCNC: 22 U/L
BILIRUB SERPL-MCNC: 0.4 MG/DL
BUN SERPL-MCNC: 16 MG/DL
CALCIUM SERPL-MCNC: 10 MG/DL
CHLORIDE SERPL-SCNC: 102 MMOL/L
CHOLEST SERPL-MCNC: 168 MG/DL
CO2 SERPL-SCNC: 27 MMOL/L
CREAT SERPL-MCNC: 0.84 MG/DL
EGFR: 70 ML/MIN/1.73M2
ESTIMATED AVERAGE GLUCOSE: 123 MG/DL
GLUCOSE SERPL-MCNC: 118 MG/DL
HBA1C MFR BLD HPLC: 5.9 %
HDLC SERPL-MCNC: 42 MG/DL
LDLC SERPL CALC-MCNC: 90 MG/DL
NONHDLC SERPL-MCNC: 126 MG/DL
POTASSIUM SERPL-SCNC: 5.1 MMOL/L
PROT SERPL-MCNC: 7.4 G/DL
SODIUM SERPL-SCNC: 140 MMOL/L
TRIGL SERPL-MCNC: 211 MG/DL

## 2024-06-19 ENCOUNTER — APPOINTMENT (OUTPATIENT)
Dept: UROLOGY | Facility: CLINIC | Age: 84
End: 2024-06-19
Payer: MEDICARE

## 2024-06-19 VITALS
RESPIRATION RATE: 14 BRPM | HEART RATE: 55 BPM | WEIGHT: 180 LBS | HEIGHT: 64 IN | OXYGEN SATURATION: 97 % | BODY MASS INDEX: 30.73 KG/M2 | DIASTOLIC BLOOD PRESSURE: 70 MMHG | SYSTOLIC BLOOD PRESSURE: 152 MMHG

## 2024-06-19 DIAGNOSIS — N81.10 CYSTOCELE, UNSPECIFIED: ICD-10-CM

## 2024-06-19 DIAGNOSIS — N32.81 OVERACTIVE BLADDER: ICD-10-CM

## 2024-06-19 DIAGNOSIS — N95.1 MENOPAUSAL AND FEMALE CLIMACTERIC STATES: ICD-10-CM

## 2024-06-19 PROCEDURE — 99214 OFFICE O/P EST MOD 30 MIN: CPT

## 2024-06-19 NOTE — LETTER BODY
[Dear  ___] : Dear  [unfilled], [Consult Letter:] : I had the pleasure of evaluating your patient, [unfilled]. [Please see my note below.] : Please see my note below. [Consult Closing:] : Thank you very much for allowing me to participate in the care of this patient.  If you have any questions, please do not hesitate to contact me. [Sincerely,] : Sincerely, [FreeTextEntry3] : Ayde Hernandez MD, [facility] Chief of Pelvic Medicine [facility]

## 2024-06-19 NOTE — ASSESSMENT
[FreeTextEntry1] : 83 y/o female with OAB dry and vaginal atrophy with anterior vaginal wall prolapse stage 2  Straight cath today Kegel exercises demonstrated and advised daily hold 3 seconds and relax for 5 seconds. 10 daily Trial of Myrberiq 25 mg.     with or without food same time every day.  Voiding diary  Discussed diet modification, avoid bladder irritants. Hydration Estrace vaginal cream 1 gm twice weekly.  Will try conservative management for anterior wall prolapse first.  follow 12 weeks  Oxybutynin discussed and side effects, including but not limited to somnolence, dementia as it crossed the blood brain barrier. Will continue on  Solfienacin g. for OAB Patient was interviewed and examined with chaperone present. Name of Chaperone: Rebecca Huggins

## 2024-06-19 NOTE — HISTORY OF PRESENT ILLNESS
[FreeTextEntry1] : ED HARTMAN  82 year F presents for frequency and urgency for the past 1 year.and now feels like her bladder is leaving her body. Was ok until started feeling discomfort. PCP ordered Ua and cx and vaginal bacteria were noted in urine culture e.g staph. Given Bactrim but patient didnt start because she did not feel UTI symptoms. Denies incontinence but wears pads for just in case. Referred by Dr. Chavarria gave her sample of Myrbetriq 25 mg and she has not started waiting this visit. Denies hematuria. Denies constipation  x 2  Normal sivan and pap smear   Patient inquiring about Oxybutynin instead of  Solfienacin for OAB  [Urinary Incontinence] : no urinary incontinence [Urinary Retention] : no urinary retention

## 2024-06-19 NOTE — REVIEW OF SYSTEMS
[Negative] : Heme/Lymph [Dry Eyes] : dryness of the eyes [Difficulty Walking] : difficulty walking [FreeTextEntry3] : bladder pressure

## 2024-06-19 NOTE — PHYSICAL EXAM
[General Appearance - Well Developed] : well developed [General Appearance - Well Nourished] : well nourished [Normal Appearance] : normal appearance [Well Groomed] : well groomed [General Appearance - In No Acute Distress] : no acute distress [Edema] : no peripheral edema [Respiration, Rhythm And Depth] : normal respiratory rhythm and effort [Exaggerated Use Of Accessory Muscles For Inspiration] : no accessory muscle use [Bowel Sounds] : normal bowel sounds [Urethral Meatus] : normal urethra [Urinary Bladder Findings] : the bladder was normal on palpation [Normal Station and Gait] : the gait and station were normal for the patient's age [] : no rash [FreeTextEntry1] : Stage 2 prolapse anterior bladder wall.  Erythema. introitus. tone 3/5 POP Q  Aa  0         Ba  0         C -6  gH  4          pB 3         TVL 10  Ap         Bp          D

## 2024-06-20 NOTE — ASSESSMENT
[FreeTextEntry1] : 84 y/o with anterior vaginal wall prlopase. Will upsize Ring with support pessary to #5.  Will return for placement  No evidence of infection. Likely estradiol residue. Instructed to irrigate vagina with hand held shower head. Continue estradiol   Oxybutynin requested as formulary change.  Oxybutynin discussed and side effects, including but not limited to somnolence, dementia as it crossed the blood brain barrier. Will continue Solfinacin for now.

## 2024-06-20 NOTE — PHYSICAL EXAM
[Normal Appearance] : normal appearance [Well Groomed] : well groomed [General Appearance - In No Acute Distress] : no acute distress [Edema] : no peripheral edema [Respiration, Rhythm And Depth] : normal respiratory rhythm and effort [Exaggerated Use Of Accessory Muscles For Inspiration] : no accessory muscle use [Abdomen Soft] : soft [Abdomen Tenderness] : non-tender [Costovertebral Angle Tenderness] : no ~M costovertebral angle tenderness [Urinary Bladder Findings] : the bladder was normal on palpation [Normal Station and Gait] : the gait and station were normal for the patient's age [] : no rash [No Focal Deficits] : no focal deficits [Oriented To Time, Place, And Person] : oriented to person, place, and time [Affect] : the affect was normal [Mood] : the mood was normal [No Palpable Adenopathy] : no palpable adenopathy [de-identified] : anterior vaginal wall prlapse. No ulcerson  speculum  eam

## 2024-06-20 NOTE — HISTORY OF PRESENT ILLNESS
[FreeTextEntry1] : ED HARTMAN  83 year F presents for pessary maintenance. Patient with past History of recurrent UTI;s and anterior vaginal wall prolapse. Pessary #4 in place. Notices bulge coming around pessary and yellowish discharge.  Denies dysuria or heamturia. compliant with Estradiol cream  [Urinary Incontinence] : no urinary incontinence [Urinary Retention] : no urinary retention

## 2024-06-21 RX ORDER — SOLIFENACIN SUCCINATE 5 MG/1
5 TABLET ORAL
Qty: 90 | Refills: 3 | Status: ACTIVE | COMMUNITY
Start: 2023-08-24 | End: 1900-01-01

## 2024-07-03 ENCOUNTER — APPOINTMENT (OUTPATIENT)
Dept: UROLOGY | Facility: CLINIC | Age: 84
End: 2024-07-03
Payer: MEDICARE

## 2024-07-03 DIAGNOSIS — N95.2 POSTMENOPAUSAL ATROPHIC VAGINITIS: ICD-10-CM

## 2024-07-03 DIAGNOSIS — N81.10 CYSTOCELE, UNSPECIFIED: ICD-10-CM

## 2024-07-03 DIAGNOSIS — N95.1 MENOPAUSAL AND FEMALE CLIMACTERIC STATES: ICD-10-CM

## 2024-07-03 DIAGNOSIS — Z63.4 DISAPPEARANCE AND DEATH OF FAMILY MEMBER: ICD-10-CM

## 2024-07-03 DIAGNOSIS — Z87.891 PERSONAL HISTORY OF NICOTINE DEPENDENCE: ICD-10-CM

## 2024-07-03 DIAGNOSIS — N32.81 OVERACTIVE BLADDER: ICD-10-CM

## 2024-07-03 PROCEDURE — 99213 OFFICE O/P EST LOW 20 MIN: CPT | Mod: 25

## 2024-07-03 PROCEDURE — 57160 INSERT PESSARY/OTHER DEVICE: CPT

## 2024-07-03 PROCEDURE — A4562: CPT

## 2024-07-03 SDOH — SOCIAL STABILITY - SOCIAL INSECURITY: DISSAPEARANCE AND DEATH OF FAMILY MEMBER: Z63.4

## 2024-08-16 ENCOUNTER — TRANSCRIPTION ENCOUNTER (OUTPATIENT)
Age: 84
End: 2024-08-16

## 2024-08-27 ENCOUNTER — TRANSCRIPTION ENCOUNTER (OUTPATIENT)
Age: 84
End: 2024-08-27

## 2024-10-16 ENCOUNTER — APPOINTMENT (OUTPATIENT)
Dept: UROLOGY | Facility: CLINIC | Age: 84
End: 2024-10-16

## 2024-11-22 NOTE — REVIEW OF SYSTEMS
[Back Pain] : back pain [Memory Loss] : memory loss [Insomnia] : insomnia [Negative] : Heme/Lymph [FreeTextEntry7] : decreased appetite  no [FreeTextEntry9] : gait instability  [de-identified] : pins/needles sensation in the digits of the hands and the digits of the left foot

## 2025-02-27 ENCOUNTER — RX RENEWAL (OUTPATIENT)
Age: 85
End: 2025-02-27

## 2025-03-24 ENCOUNTER — RX RENEWAL (OUTPATIENT)
Age: 85
End: 2025-03-24

## 2025-08-04 DIAGNOSIS — Z23 ENCOUNTER FOR IMMUNIZATION: ICD-10-CM
